# Patient Record
Sex: FEMALE | Race: WHITE | Employment: OTHER | URBAN - METROPOLITAN AREA
[De-identification: names, ages, dates, MRNs, and addresses within clinical notes are randomized per-mention and may not be internally consistent; named-entity substitution may affect disease eponyms.]

---

## 2022-03-10 ENCOUNTER — OFFICE VISIT (OUTPATIENT)
Dept: CARDIOLOGY CLINIC | Age: 62
End: 2022-03-10
Payer: COMMERCIAL

## 2022-03-10 VITALS
BODY MASS INDEX: 38.25 KG/M2 | HEART RATE: 73 BPM | SYSTOLIC BLOOD PRESSURE: 120 MMHG | WEIGHT: 238 LBS | DIASTOLIC BLOOD PRESSURE: 72 MMHG | OXYGEN SATURATION: 97 % | HEIGHT: 66 IN

## 2022-03-10 DIAGNOSIS — J45.909 UNCOMPLICATED ASTHMA, UNSPECIFIED ASTHMA SEVERITY, UNSPECIFIED WHETHER PERSISTENT: ICD-10-CM

## 2022-03-10 DIAGNOSIS — G47.33 OSA (OBSTRUCTIVE SLEEP APNEA): ICD-10-CM

## 2022-03-10 DIAGNOSIS — I15.9 SECONDARY HYPERTENSION: ICD-10-CM

## 2022-03-10 DIAGNOSIS — E07.9 THYROID DISORDER: ICD-10-CM

## 2022-03-10 DIAGNOSIS — M79.7 FIBROMYALGIA: ICD-10-CM

## 2022-03-10 DIAGNOSIS — R07.9 CHEST PAIN, UNSPECIFIED TYPE: Primary | ICD-10-CM

## 2022-03-10 DIAGNOSIS — E78.5 HYPERLIPIDEMIA, UNSPECIFIED HYPERLIPIDEMIA TYPE: ICD-10-CM

## 2022-03-10 DIAGNOSIS — Z86.711 HISTORY OF PULMONARY EMBOLISM: ICD-10-CM

## 2022-03-10 DIAGNOSIS — E11.9 CONTROLLED TYPE 2 DIABETES MELLITUS WITHOUT COMPLICATION, WITH LONG-TERM CURRENT USE OF INSULIN (HCC): ICD-10-CM

## 2022-03-10 DIAGNOSIS — Z79.4 CONTROLLED TYPE 2 DIABETES MELLITUS WITHOUT COMPLICATION, WITH LONG-TERM CURRENT USE OF INSULIN (HCC): ICD-10-CM

## 2022-03-10 PROCEDURE — 99205 OFFICE O/P NEW HI 60 MIN: CPT | Performed by: INTERNAL MEDICINE

## 2022-03-10 PROCEDURE — 93000 ELECTROCARDIOGRAM COMPLETE: CPT | Performed by: INTERNAL MEDICINE

## 2022-03-10 RX ORDER — BUPROPION HYDROCHLORIDE 300 MG/1
300 TABLET ORAL DAILY
COMMUNITY
Start: 2021-10-13

## 2022-03-10 RX ORDER — ASPIRIN 325 MG
325 TABLET, DELAYED RELEASE (ENTERIC COATED) ORAL DAILY
COMMUNITY

## 2022-03-10 RX ORDER — ROPINIROLE 1 MG/1
1 TABLET, FILM COATED ORAL DAILY
COMMUNITY
Start: 2021-10-13

## 2022-03-10 RX ORDER — CETIRIZINE HYDROCHLORIDE 10 MG/1
10 CAPSULE, LIQUID FILLED ORAL DAILY
COMMUNITY
Start: 2021-10-13

## 2022-03-10 RX ORDER — INSULIN DEGLUDEC INJECTION 200 U/ML
INJECTION, SOLUTION SUBCUTANEOUS
COMMUNITY
Start: 2022-02-07 | End: 2022-03-10

## 2022-03-10 RX ORDER — POLYETHYLENE GLYCOL 3350 17 G/17G
17 POWDER, FOR SOLUTION ORAL DAILY
COMMUNITY
Start: 2021-04-05

## 2022-03-10 RX ORDER — URSODIOL 500 MG/1
500 TABLET, FILM COATED ORAL 3 TIMES DAILY
COMMUNITY
Start: 2022-02-08

## 2022-03-10 RX ORDER — LEVOTHYROXINE SODIUM 150 MCG
TABLET ORAL
COMMUNITY
Start: 2022-02-02 | End: 2022-05-19

## 2022-03-10 RX ORDER — DAPAGLIFLOZIN 10 MG/1
10 TABLET, FILM COATED ORAL DAILY
COMMUNITY
Start: 2022-02-02

## 2022-03-10 RX ORDER — INSULIN DEGLUDEC 100 U/ML
51 INJECTION, SOLUTION SUBCUTANEOUS DAILY
COMMUNITY

## 2022-03-10 RX ORDER — FLUOXETINE HYDROCHLORIDE 40 MG/1
40 CAPSULE ORAL DAILY
COMMUNITY
Start: 2022-02-02

## 2022-03-10 RX ORDER — EZETIMIBE 10 MG/1
10 TABLET ORAL DAILY
COMMUNITY
Start: 2022-02-02

## 2022-03-10 RX ORDER — MELOXICAM 15 MG/1
1 TABLET ORAL DAILY
COMMUNITY
Start: 2021-10-13

## 2022-03-10 RX ORDER — ARIPIPRAZOLE 5 MG/1
5 TABLET ORAL DAILY
COMMUNITY
Start: 2022-02-02

## 2022-03-10 RX ORDER — FLUTICASONE PROPIONATE 50 MCG
2 SPRAY, SUSPENSION (ML) NASAL DAILY
COMMUNITY
Start: 2022-02-02

## 2022-03-10 NOTE — PROGRESS NOTES
Calvinodette Ulloa presents today for   Chief Complaint   Patient presents with    New Patient     Ref by PCP for Chest Pain / SOB    Shortness of Breath     at times    Chest Pain     1 episode 6 weeks ago, sharp / pressure across chest to shoulders       Taryn Khan preferred language for health care discussion is english/other. Is someone accompanying this pt? no    Is the patient using any DME equipment during [address]? no    Depression Screening:  3 most recent PHQ Screens 3/10/2022   Little interest or pleasure in doing things Not at all   Feeling down, depressed, irritable, or hopeless Not at all   Total Score PHQ 2 0       Learning Assessment:  Learning Assessment 3/10/2022   PRIMARY LEARNER Patient   PRIMARY LANGUAGE ENGLISH   LEARNER PREFERENCE PRIMARY DEMONSTRATION   ANSWERED BY patient   RELATIONSHIP SELF       Abuse Screening:  Abuse Screening Questionnaire 3/10/2022   Do you ever feel afraid of your partner? N   Are you in a relationship with someone who physically or mentally threatens you? N   Is it safe for you to go home? Y             Pt currently taking Anticoagulant therapy? no    Pt currently taking Antiplatelet therapy ?  mg once a day      Coordination of Care:  1. Have you been to the ER, urgent care clinic since your last visit? Hospitalized since your last visit? no    2. Have you seen or consulted any other health care providers outside of the [address] [place] [address] since your last visit? Include any pap smears or colon screening.  no

## 2022-03-10 NOTE — PROGRESS NOTES
History of Present Illness:  59year old female referred for chest pain. Four to five weeks ago she had some mild dyspnea and she had some sharp pressure sensation across her chest that woke her up from sleep. It resolved and by the morning she felt well, but in general did have some fatigue for about 24 hours and finally she is near baseline. She does continue to have some exertional dyspnea. She has never had a stress test or echocardiogram that she knows of. Currently no chest pain today. No significant edema. No PND or orthopnea. She is here to discuss options. Impression:  1. Recent episode of chest pain four to five weeks ago, occurring at night, with some mild dyspnea and symptoms of fatigue for about 24 hours after. 2. Intermittent exertional dyspnea. 3. Diabetes for 15 years with last hemoglobin A1c 8-9 on insulin. 4. Hypertension. 5. Dyslipidemia, on statin. 6. History of asthma. 7. History of remote pulmonary embolism postoperatively in 2019.  8. Thyroid disorder, stable by report. 9. Sclerosing cholangitis, conservative management and mild per patient. 10. BMI of 38.  11. History of fibromyalgia, stable. Plan:  Given her chest pain and risk factors, I recommended pharmacological cardiac nuclear stress test, as well as echocardiogram. If these are even mildly abnormal, I talked about proceeding to heart catheterization and this was discussed. If her testing is normal, I will be available as needed and I would continue with aggressive risk factor modification. All questions answered.       Past Medical History:   Diagnosis Date    Asthma     COPD (chronic obstructive pulmonary disease) (HCC)     Diabetes (HCC)     GERD (gastroesophageal reflux disease)     Heart murmur     Hypercholesterolemia     Rectal pain     discomfort.  small amount of blood on tissue off and on    RLS (restless legs syndrome)     Sleep apnea     Thyroid disease        Current Outpatient Medications Medication Sig Dispense Refill    aspirin delayed-release 325 mg tablet Take 325 mg by mouth daily.  Farxiga 10 mg tab tablet Take 10 mg by mouth daily.  buPROPion XL (WELLBUTRIN XL) 300 mg XL tablet Take 300 mg by mouth daily.  ezetimibe (ZETIA) 10 mg tablet Take 10 mg by mouth daily.  Synthroid 150 mcg tablet       ARIPiprazole (ABILIFY) 5 mg tablet Take 5 mg by mouth daily.  FLUoxetine (PROzac) 40 mg capsule Take 40 mg by mouth daily.  meloxicam (MOBIC) 15 mg tablet Take 1 Tablet by mouth daily.  fluticasone propionate (FLONASE) 50 mcg/actuation nasal spray 2 Sprays by Both Nostrils route daily.  vitamin E acetate (VITAMIN E PO) Take 5,000 mg by mouth daily.  polyethylene glycol (MIRALAX) 17 gram/dose powder Take 17 g by mouth daily.  ursodioL (ACTIGALL) 500 mg tablet Take 500 mg by mouth three (3) times daily.  rOPINIRole (REQUIP) 1 mg tablet Take 1 Tablet by mouth daily.  Cetirizine (ZyrTEC) 10 mg cap Take 10 mg by mouth daily.  insulin degludec 100 unit/mL (3 mL) inpn 51 Units by SubCUTAneous route daily.  LANSOPRAZOLE (PREVACID PO) Take 30 mg by mouth daily.  trazodone (DESYREL) 100 mg tablet Take 100 mg by mouth nightly. Social History   reports that she has never smoked. She has never used smokeless tobacco.   reports current alcohol use. Family History  family history includes Colon Polyps in her mother. Review of Systems  Except as stated above include:  Constitutional: Negative for fever, chills and malaise/fatigue. HEENT: No congestion or recent URI. Gastrointestinal: No nausea, vomiting, abdominal pain, bloody stools. Pulmonary:  Negative except as stated above. Cardiac:  Negative except as stated above. Musculoskeletal: Negative except as stated above. Neurological:  No localized symptoms. Skin:  Negative except as stated above. Psych:  Negative except as stated above.   Endocrine:  Negative except as stated above. PHYSICAL EXAM  BP Readings from Last 3 Encounters:   03/10/22 120/72   06/24/09 130/80     Pulse Readings from Last 3 Encounters:   03/10/22 73   06/24/09 74     Wt Readings from Last 3 Encounters:   03/10/22 108 kg (238 lb)   06/24/09 113.4 kg (250 lb)     General:   Well developed, well groomed. Head/Neck:   No obvious jugular venous distention     No obvious carotid pulsations. No evidence of xanthelasma. Lungs:   No respiratory distress. Clear bilaterally. Heart:  Regular rate and rhythm. Normal S1/S2. Palpation grossly normal.    No significant murmurs, rubs or gallops. Abdomen:   Non-acute abdomen. No obvious pulsations. Extremities:   Intact peripheral pulses. No significant edema. Neurological:   Alert and oriented to person, place, time. No focal neurological deficit visually. Skin:   No obvious rash    Blood Pressure Metric:  Monitor recommended and adjustments stated if needed.

## 2022-04-15 ENCOUNTER — TELEPHONE (OUTPATIENT)
Dept: CARDIOLOGY CLINIC | Age: 62
End: 2022-04-15

## 2022-04-19 ENCOUNTER — TELEPHONE (OUTPATIENT)
Dept: CARDIOLOGY CLINIC | Age: 62
End: 2022-04-19

## 2022-04-19 NOTE — TELEPHONE ENCOUNTER
Called pt to let her know that Dr Nilton Monreal wanted her to come into the office so he could discuss her nuclear stress test results. Pt's  has an appt tomorrow with Dr Nilton Monreal at [phone], and they drive from [facility], so offered appt for same time slot, and pt was gratetful for accommodation.

## 2022-04-20 ENCOUNTER — OFFICE VISIT (OUTPATIENT)
Dept: CARDIOLOGY CLINIC | Age: 62
End: 2022-04-20
Payer: COMMERCIAL

## 2022-04-20 VITALS
DIASTOLIC BLOOD PRESSURE: 64 MMHG | WEIGHT: 236 LBS | HEART RATE: 70 BPM | HEIGHT: 66 IN | BODY MASS INDEX: 37.93 KG/M2 | OXYGEN SATURATION: 96 % | SYSTOLIC BLOOD PRESSURE: 100 MMHG

## 2022-04-20 DIAGNOSIS — E11.9 CONTROLLED TYPE 2 DIABETES MELLITUS WITHOUT COMPLICATION, WITH LONG-TERM CURRENT USE OF INSULIN (HCC): ICD-10-CM

## 2022-04-20 DIAGNOSIS — R94.39 ABNORMAL STRESS TEST: Primary | ICD-10-CM

## 2022-04-20 DIAGNOSIS — E07.9 THYROID DISORDER: ICD-10-CM

## 2022-04-20 DIAGNOSIS — R07.9 CHEST PAIN, UNSPECIFIED TYPE: ICD-10-CM

## 2022-04-20 DIAGNOSIS — E78.5 HYPERLIPIDEMIA, UNSPECIFIED HYPERLIPIDEMIA TYPE: ICD-10-CM

## 2022-04-20 DIAGNOSIS — Z79.4 CONTROLLED TYPE 2 DIABETES MELLITUS WITHOUT COMPLICATION, WITH LONG-TERM CURRENT USE OF INSULIN (HCC): ICD-10-CM

## 2022-04-20 DIAGNOSIS — G47.33 OSA (OBSTRUCTIVE SLEEP APNEA): ICD-10-CM

## 2022-04-20 DIAGNOSIS — M79.7 FIBROMYALGIA: ICD-10-CM

## 2022-04-20 PROCEDURE — 99215 OFFICE O/P EST HI 40 MIN: CPT | Performed by: INTERNAL MEDICINE

## 2022-04-20 RX ORDER — INSULIN GLARGINE 300 U/ML
150 INJECTION, SOLUTION SUBCUTANEOUS
COMMUNITY
Start: 2021-11-30 | End: 2022-05-19

## 2022-04-20 RX ORDER — ALBUTEROL SULFATE 90 UG/1
AEROSOL, METERED RESPIRATORY (INHALATION)
COMMUNITY
Start: 2022-02-02 | End: 2022-05-19

## 2022-04-20 RX ORDER — SODIUM CHLORIDE 0.9 % (FLUSH) 0.9 %
5-40 SYRINGE (ML) INJECTION EVERY 8 HOURS
Status: CANCELLED | OUTPATIENT
Start: 2022-04-20

## 2022-04-20 RX ORDER — SODIUM CHLORIDE 9 MG/ML
1000 INJECTION, SOLUTION INTRAVENOUS CONTINUOUS
Status: CANCELLED | OUTPATIENT
Start: 2022-04-20

## 2022-04-20 RX ORDER — SODIUM CHLORIDE 0.9 % (FLUSH) 0.9 %
5-40 SYRINGE (ML) INJECTION AS NEEDED
Status: CANCELLED | OUTPATIENT
Start: 2022-04-20

## 2022-04-20 NOTE — PATIENT INSTRUCTIONS
Instructions    Please have Lab work and Chest x-ray completed soon. Patients Name:  Vicenta Fox    1. You are scheduled to have a Cardiac Catheterization on April 27, 2022  at 1:00 p.m. Please check in at 11:30 a.m  .     2. Please go to [facility] and park in the outpatient parking lot that is located around to the back of the hospital and enter through the [facility] building. Once you enter through the [facility], check in with the  there. The  will either give you directions or assist you in getting to the cath holding area. 3. You are not to eat or drink anything after midnight the night before your procedure. Small sips of water to take your medications is ok. 4. If you are diabetic, do not take your insulin/sugar pill the morning of the procedure. (FARXIGA, Insulin    5. MEDICATION INSTRUCTIONS:   Please take your morning medications with the following special instructions:    [x]          Please make sure to take your Blood pressure medication : MOBIC. [x]          Take your Aspirin       6. We encourage families to wait in the waiting room on the first floor while the procedure is being done. The Doctor will come out and talk with you as soon as the procedure is over. 7. There is the possibility that you may spend the night in the hospital, depending on the results of the procedure. This will be determined after the procedure is done. If angioplasty or stent is planned, you will stay at least one day. 8. If you or your family have any questions, please call our office Monday Friday, 9:00 a. m.4:30 p.m.,  At [phone], and ask to speak to one of the nurses. You will have a follow up appointment in office 4-6 weeks after procedure- someone should call you to schedule.

## 2022-04-20 NOTE — PROGRESS NOTES
History of Present Illness:  57year old female here for follow up. I saw her in March. She had been having some occasional chest pain for four to five weeks and mild dyspnea. She still is having some pain, usually at night, and she has some exertional dyspnea. No syncope, PND, orthopnea or edema. She is accompanied with her  today, who I am also seeing. She has never had a heart catheterization. She had a recent stress test that was abnormal and is here to discuss results. Impression:  1. Abnormal intermediate risk stress test earlier this week with possible inferior ischemia. Intermittent chest pain and dyspnea in the setting of diabetes as an anginal equivalent. 2. Diabetes for 15 years with hemoglobin A1c running 8-9 on insulin. 3. Hypertension. 4. Dyslipidemia, on statin. 5. History of asthma. 6. Remote PE postoperatively in 2019, on oral anticoagulation transiently, not currently. She is just on aspirin. 7. Thyroid disorder. 8. History of sclerosing cholangitis with conservative management in the past.  9. History of fibromyalgia. Plan:  Although she has a history of fibromyalgia and sclerosing cholangitis, asthma, and other potential etiologies for chest pain, given her risk factors and abnormal stress test, I recommended heart catheterization. Risks, benefits and alternatives discussed, all questions answered and I will make arrangements.       Past Medical History:   Diagnosis Date    Asthma     COPD (chronic obstructive pulmonary disease) (HCC)     Diabetes (HCC)     GERD (gastroesophageal reflux disease)     Heart murmur     Hypercholesterolemia     Rectal pain     discomfort.  small amount of blood on tissue off and on    RLS (restless legs syndrome)     Sleep apnea     Thyroid disease        Current Outpatient Medications   Medication Sig Dispense Refill    albuterol (PROVENTIL HFA, VENTOLIN HFA, PROAIR HFA) 90 mcg/actuation inhaler       insulin glargine U-300 conc (Toujeo Max U-300 SoloStar) 300 unit/mL (3 mL) inpn 150 Units.  aspirin delayed-release 325 mg tablet Take 325 mg by mouth daily.  Farxiga 10 mg tab tablet Take 10 mg by mouth daily.  buPROPion XL (WELLBUTRIN XL) 300 mg XL tablet Take 300 mg by mouth daily.  ezetimibe (ZETIA) 10 mg tablet Take 10 mg by mouth daily.  Synthroid 150 mcg tablet       ARIPiprazole (ABILIFY) 5 mg tablet Take 5 mg by mouth daily.  FLUoxetine (PROzac) 40 mg capsule Take 40 mg by mouth daily.  meloxicam (MOBIC) 15 mg tablet Take 1 Tablet by mouth daily.  fluticasone propionate (FLONASE) 50 mcg/actuation nasal spray 2 Sprays by Both Nostrils route daily.  vitamin E acetate (VITAMIN E PO) Take 5,000 mg by mouth daily.  polyethylene glycol (MIRALAX) 17 gram/dose powder Take 17 g by mouth daily.  ursodioL (ACTIGALL) 500 mg tablet Take 500 mg by mouth three (3) times daily.  rOPINIRole (REQUIP) 1 mg tablet Take 1 Tablet by mouth daily.  Cetirizine (ZyrTEC) 10 mg cap Take 10 mg by mouth daily.  insulin degludec 100 unit/mL (3 mL) inpn 51 Units by SubCUTAneous route daily.  LANSOPRAZOLE (PREVACID PO) Take 30 mg by mouth daily.  trazodone (DESYREL) 100 mg tablet Take 100 mg by mouth nightly. Social History   reports that she has never smoked. She has never used smokeless tobacco.   reports current alcohol use. Family History  family history includes Colon Polyps in her mother. Review of Systems  Except as stated above include:  Constitutional: Negative for fever, chills and malaise/fatigue. HEENT: No congestion or recent URI. Gastrointestinal: No nausea, vomiting, abdominal pain, bloody stools. Pulmonary:  Negative except as stated above. Cardiac:  Negative except as stated above. Musculoskeletal: Negative except as stated above. Neurological:  No localized symptoms. Skin:  Negative except as stated above.   Psych:  Negative except as stated above.  Endocrine:  Negative except as stated above. PHYSICAL EXAM  BP Readings from Last 3 Encounters:   04/20/22 100/64   04/18/22 120/72   04/18/22 120/80     Pulse Readings from Last 3 Encounters:   04/20/22 70   03/10/22 73   06/24/09 74     Wt Readings from Last 3 Encounters:   04/20/22 107 kg (236 lb)   04/18/22 108 kg (238 lb)   04/18/22 108 kg (238 lb)     General:   Well developed, well groomed. Head/Neck:   No obvious jugular venous distention     No obvious carotid pulsations. No evidence of xanthelasma. Lungs:   No respiratory distress. Clear bilaterally. Heart:  Regular rate and rhythm. Normal S1/S2. Palpation grossly normal.    No significant murmurs, rubs or gallops. Abdomen:   Non-acute abdomen. No obvious pulsations. Extremities:   Intact peripheral pulses. No significant edema. Neurological:   Alert and oriented to person, place, time. No focal neurological deficit visually. Skin:   No obvious rash    Blood Pressure Metric:  Monitor recommended and adjustments stated if needed.

## 2022-04-20 NOTE — PROGRESS NOTES
Aissatou Ponce presents today for   Chief Complaint   Patient presents with    Follow-up     abn nuc        Sharlett Carrier Gladyso preferred language for health care discussion is english/other. Is someone accompanying this pt? no    Is the patient using any DME equipment during [address]? no    Depression Screening:  3 most recent PHQ Screens 3/10/2022   Little interest or pleasure in doing things Not at all   Feeling down, depressed, irritable, or hopeless Not at all   Total Score PHQ 2 0       Learning Assessment:  Learning Assessment 3/10/2022   PRIMARY LEARNER Patient   PRIMARY LANGUAGE ENGLISH   LEARNER PREFERENCE PRIMARY DEMONSTRATION   ANSWERED BY patient   RELATIONSHIP SELF       Abuse Screening:  Abuse Screening Questionnaire 3/10/2022   Do you ever feel afraid of your partner? N   Are you in a relationship with someone who physically or mentally threatens you? N   Is it safe for you to go home? Y     Pt currently taking Anticoagulant therapy? ASA 81mg every day     Coordination of Care:  1. Have you been to the ER, urgent care clinic since your last visit? Hospitalized since your last visit? no    2. Have you seen or consulted any other health care providers outside of the [address] [place] [address] since your last visit? Include any pap smears or colon screening.  no

## 2022-04-21 ENCOUNTER — HOSPITAL ENCOUNTER (OUTPATIENT)
Dept: LAB | Age: 62
Discharge: HOME | End: 2022-04-21
Payer: COMMERCIAL

## 2022-04-21 ENCOUNTER — HOSPITAL ENCOUNTER (OUTPATIENT)
Dept: GENERAL RADIOLOGY | Age: 62
Discharge: HOME | End: 2022-04-21
Payer: COMMERCIAL

## 2022-04-21 DIAGNOSIS — R94.39 ABNORMAL STRESS TEST: ICD-10-CM

## 2022-04-21 LAB
ALBUMIN SERPL-MCNC: 3.7 G/DL (ref 3.4–5)
ALBUMIN SERPL-MCNC: 3.7 G/DL (ref 3.4–5)
ALBUMIN/GLOB SERPL: 1 {RATIO} (ref 0.8–1.7)
ALBUMIN/GLOB SERPL: 1.1 {RATIO} (ref 0.8–1.7)
ALP SERPL-CCNC: 184 U/L (ref 45–117)
ALP SERPL-CCNC: 185 U/L (ref 45–117)
ALT SERPL-CCNC: 35 U/L (ref 13–56)
ALT SERPL-CCNC: 36 U/L (ref 13–56)
ANION GAP SERPL CALC-SCNC: 5 MMOL/L (ref 3–18)
ANION GAP SERPL CALC-SCNC: 7 MMOL/L (ref 3–18)
AST SERPL-CCNC: 15 U/L (ref 10–38)
AST SERPL-CCNC: 17 U/L (ref 10–38)
BASOPHILS # BLD: 0.1 K/UL (ref 0–0.1)
BASOPHILS # BLD: 0.1 K/UL (ref 0–0.1)
BASOPHILS NFR BLD: 0 % (ref 0–2)
BASOPHILS NFR BLD: 1 % (ref 0–2)
BILIRUB SERPL-MCNC: 0.3 MG/DL (ref 0.2–1)
BILIRUB SERPL-MCNC: 0.3 MG/DL (ref 0.2–1)
BUN SERPL-MCNC: 18 MG/DL (ref 7–18)
BUN SERPL-MCNC: 18 MG/DL (ref 7–18)
BUN/CREAT SERPL: 21 (ref 12–20)
BUN/CREAT SERPL: 22 (ref 12–20)
CALCIUM SERPL-MCNC: 9.5 MG/DL (ref 8.5–10.1)
CALCIUM SERPL-MCNC: 9.6 MG/DL (ref 8.5–10.1)
CHLORIDE SERPL-SCNC: 111 MMOL/L (ref 100–111)
CHLORIDE SERPL-SCNC: 111 MMOL/L (ref 100–111)
CHOLEST SERPL-MCNC: 187 MG/DL
CO2 SERPL-SCNC: 25 MMOL/L (ref 21–32)
CO2 SERPL-SCNC: 26 MMOL/L (ref 21–32)
CREAT SERPL-MCNC: 0.82 MG/DL (ref 0.6–1.3)
CREAT SERPL-MCNC: 0.87 MG/DL (ref 0.6–1.3)
DIFFERENTIAL METHOD BLD: ABNORMAL
DIFFERENTIAL METHOD BLD: ABNORMAL
EOSINOPHIL # BLD: 0.3 K/UL (ref 0–0.4)
EOSINOPHIL # BLD: 0.3 K/UL (ref 0–0.4)
EOSINOPHIL NFR BLD: 3 % (ref 0–5)
EOSINOPHIL NFR BLD: 3 % (ref 0–5)
ERYTHROCYTE [DISTWIDTH] IN BLOOD BY AUTOMATED COUNT: 12.7 % (ref 11.6–14.5)
ERYTHROCYTE [DISTWIDTH] IN BLOOD BY AUTOMATED COUNT: 12.8 % (ref 11.6–14.5)
GLOBULIN SER CALC-MCNC: 3.5 G/DL (ref 2–4)
GLOBULIN SER CALC-MCNC: 3.6 G/DL (ref 2–4)
GLUCOSE SERPL-MCNC: 55 MG/DL (ref 74–99)
GLUCOSE SERPL-MCNC: 57 MG/DL (ref 74–99)
HBA1C MFR BLD: 8.7 % (ref 4.2–5.6)
HCT VFR BLD AUTO: 45.5 % (ref 35–45)
HCT VFR BLD AUTO: 47.2 % (ref 35–45)
HDLC SERPL-MCNC: 51 MG/DL (ref 40–60)
HDLC SERPL: 3.7 {RATIO} (ref 0–5)
HGB BLD-MCNC: 15 G/DL (ref 12–16)
HGB BLD-MCNC: 15.3 G/DL (ref 12–16)
IMM GRANULOCYTES # BLD AUTO: 0 K/UL (ref 0–0.04)
IMM GRANULOCYTES # BLD AUTO: 0 K/UL (ref 0–0.04)
IMM GRANULOCYTES NFR BLD AUTO: 0 % (ref 0–0.5)
IMM GRANULOCYTES NFR BLD AUTO: 0 % (ref 0–0.5)
INR PPP: 1 (ref 0.8–1.2)
LDLC SERPL CALC-MCNC: 105 MG/DL (ref 0–100)
LIPID PROFILE,FLP: ABNORMAL
LYMPHOCYTES # BLD: 2.6 K/UL (ref 0.9–3.6)
LYMPHOCYTES # BLD: 2.7 K/UL (ref 0.9–3.6)
LYMPHOCYTES NFR BLD: 24 % (ref 21–52)
LYMPHOCYTES NFR BLD: 24 % (ref 21–52)
MCH RBC QN AUTO: 29.7 PG (ref 24–34)
MCH RBC QN AUTO: 30.1 PG (ref 24–34)
MCHC RBC AUTO-ENTMCNC: 32.4 G/DL (ref 31–37)
MCHC RBC AUTO-ENTMCNC: 33 G/DL (ref 31–37)
MCV RBC AUTO: 91.2 FL (ref 78–100)
MCV RBC AUTO: 91.5 FL (ref 78–100)
MONOCYTES # BLD: 0.6 K/UL (ref 0.05–1.2)
MONOCYTES # BLD: 0.6 K/UL (ref 0.05–1.2)
MONOCYTES NFR BLD: 5 % (ref 3–10)
MONOCYTES NFR BLD: 5 % (ref 3–10)
NEUTS SEG # BLD: 7.4 K/UL (ref 1.8–8)
NEUTS SEG # BLD: 7.6 K/UL (ref 1.8–8)
NEUTS SEG NFR BLD: 68 % (ref 40–73)
NEUTS SEG NFR BLD: 68 % (ref 40–73)
NRBC # BLD: 0 K/UL (ref 0–0.01)
NRBC # BLD: 0 K/UL (ref 0–0.01)
NRBC BLD-RTO: 0 PER 100 WBC
NRBC BLD-RTO: 0 PER 100 WBC
PLATELET # BLD AUTO: 353 K/UL (ref 135–420)
PLATELET # BLD AUTO: 358 K/UL (ref 135–420)
PMV BLD AUTO: 9.6 FL (ref 9.2–11.8)
PMV BLD AUTO: 9.9 FL (ref 9.2–11.8)
POTASSIUM SERPL-SCNC: 3.8 MMOL/L (ref 3.5–5.5)
POTASSIUM SERPL-SCNC: 3.9 MMOL/L (ref 3.5–5.5)
PROT SERPL-MCNC: 7.2 G/DL (ref 6.4–8.2)
PROT SERPL-MCNC: 7.3 G/DL (ref 6.4–8.2)
PROTHROMBIN TIME: 12.8 SEC (ref 11.5–15.2)
RBC # BLD AUTO: 4.99 M/UL (ref 4.2–5.3)
RBC # BLD AUTO: 5.16 M/UL (ref 4.2–5.3)
SODIUM SERPL-SCNC: 142 MMOL/L (ref 136–145)
SODIUM SERPL-SCNC: 143 MMOL/L (ref 136–145)
T4 FREE SERPL-MCNC: 1.1 NG/DL (ref 0.7–1.5)
TRIGL SERPL-MCNC: 155 MG/DL (ref ?–150)
TSH SERPL DL<=0.05 MIU/L-ACNC: 2.37 UIU/ML (ref 0.36–3.74)
VLDLC SERPL CALC-MCNC: 31 MG/DL
WBC # BLD AUTO: 10.9 K/UL (ref 4.6–13.2)
WBC # BLD AUTO: 11.2 K/UL (ref 4.6–13.2)

## 2022-04-21 PROCEDURE — 85025 COMPLETE CBC W/AUTO DIFF WBC: CPT

## 2022-04-21 PROCEDURE — 80053 COMPREHEN METABOLIC PANEL: CPT

## 2022-04-21 PROCEDURE — 85610 PROTHROMBIN TIME: CPT

## 2022-04-21 PROCEDURE — 80061 LIPID PANEL: CPT

## 2022-04-21 PROCEDURE — 84439 ASSAY OF FREE THYROXINE: CPT

## 2022-04-21 PROCEDURE — 83036 HEMOGLOBIN GLYCOSYLATED A1C: CPT

## 2022-04-21 PROCEDURE — 71046 X-RAY EXAM CHEST 2 VIEWS: CPT

## 2022-04-21 PROCEDURE — 36415 COLL VENOUS BLD VENIPUNCTURE: CPT

## 2022-04-27 ENCOUNTER — HOSPITAL ENCOUNTER (OUTPATIENT)
Age: 62
Setting detail: OUTPATIENT SURGERY
Discharge: HOME | End: 2022-04-27
Attending: INTERNAL MEDICINE | Admitting: INTERNAL MEDICINE
Payer: COMMERCIAL

## 2022-04-27 VITALS
TEMPERATURE: 98.2 F | RESPIRATION RATE: 18 BRPM | SYSTOLIC BLOOD PRESSURE: 106 MMHG | BODY MASS INDEX: 37.93 KG/M2 | WEIGHT: 236 LBS | HEART RATE: 77 BPM | OXYGEN SATURATION: 96 % | HEIGHT: 66 IN | DIASTOLIC BLOOD PRESSURE: 69 MMHG

## 2022-04-27 DIAGNOSIS — R93.1 ABNORMAL NUCLEAR CARDIAC IMAGING TEST: ICD-10-CM

## 2022-04-27 LAB — GLUCOSE BLD STRIP.AUTO-MCNC: 74 MG/DL (ref 70–110)

## 2022-04-27 PROCEDURE — 93458 L HRT ARTERY/VENTRICLE ANGIO: CPT | Performed by: INTERNAL MEDICINE

## 2022-04-27 PROCEDURE — 74011250636 HC RX REV CODE- 250/636: Performed by: INTERNAL MEDICINE

## 2022-04-27 PROCEDURE — 74011250637 HC RX REV CODE- 250/637: Performed by: INTERNAL MEDICINE

## 2022-04-27 PROCEDURE — 99152 MOD SED SAME PHYS/QHP 5/>YRS: CPT | Performed by: INTERNAL MEDICINE

## 2022-04-27 PROCEDURE — 74011000636 HC RX REV CODE- 636: Performed by: INTERNAL MEDICINE

## 2022-04-27 PROCEDURE — 74011000250 HC RX REV CODE- 250: Performed by: INTERNAL MEDICINE

## 2022-04-27 PROCEDURE — 77030015766: Performed by: INTERNAL MEDICINE

## 2022-04-27 PROCEDURE — 77030012597: Performed by: INTERNAL MEDICINE

## 2022-04-27 PROCEDURE — 77030013761 HC KT HRT LFT ANGI -B: Performed by: INTERNAL MEDICINE

## 2022-04-27 PROCEDURE — C1894 INTRO/SHEATH, NON-LASER: HCPCS | Performed by: INTERNAL MEDICINE

## 2022-04-27 PROCEDURE — 51700 IRRIGATION OF BLADDER: CPT

## 2022-04-27 PROCEDURE — 77030013797 HC KT TRNSDUC PRSSR EDWD -A: Performed by: INTERNAL MEDICINE

## 2022-04-27 PROCEDURE — 82962 GLUCOSE BLOOD TEST: CPT

## 2022-04-27 PROCEDURE — 77030029997 HC DEV COM RDL R BND TELE -B: Performed by: INTERNAL MEDICINE

## 2022-04-27 RX ORDER — HEPARIN SODIUM 1000 [USP'U]/ML
INJECTION, SOLUTION INTRAVENOUS; SUBCUTANEOUS AS NEEDED
Status: DISCONTINUED | OUTPATIENT
Start: 2022-04-27 | End: 2022-04-27 | Stop reason: HOSPADM

## 2022-04-27 RX ORDER — HEPARIN SODIUM 200 [USP'U]/100ML
INJECTION, SOLUTION INTRAVENOUS AS NEEDED
Status: DISCONTINUED | OUTPATIENT
Start: 2022-04-27 | End: 2022-04-27 | Stop reason: HOSPADM

## 2022-04-27 RX ORDER — GUAIFENESIN 100 MG/5ML
324 LIQUID (ML) ORAL
Status: COMPLETED | OUTPATIENT
Start: 2022-04-27 | End: 2022-04-27

## 2022-04-27 RX ORDER — VERAPAMIL HYDROCHLORIDE 2.5 MG/ML
INJECTION, SOLUTION INTRAVENOUS AS NEEDED
Status: DISCONTINUED | OUTPATIENT
Start: 2022-04-27 | End: 2022-04-27 | Stop reason: HOSPADM

## 2022-04-27 RX ORDER — LIDOCAINE HYDROCHLORIDE 10 MG/ML
INJECTION, SOLUTION EPIDURAL; INFILTRATION; INTRACAUDAL; PERINEURAL AS NEEDED
Status: DISCONTINUED | OUTPATIENT
Start: 2022-04-27 | End: 2022-04-27 | Stop reason: HOSPADM

## 2022-04-27 RX ORDER — SODIUM CHLORIDE 9 MG/ML
1000 INJECTION, SOLUTION INTRAVENOUS CONTINUOUS
Status: DISCONTINUED | OUTPATIENT
Start: 2022-04-27 | End: 2022-04-27 | Stop reason: HOSPADM

## 2022-04-27 RX ORDER — SODIUM CHLORIDE 0.9 % (FLUSH) 0.9 %
5-40 SYRINGE (ML) INJECTION EVERY 8 HOURS
Status: DISCONTINUED | OUTPATIENT
Start: 2022-04-27 | End: 2022-04-27 | Stop reason: HOSPADM

## 2022-04-27 RX ORDER — SODIUM CHLORIDE 0.9 % (FLUSH) 0.9 %
5-40 SYRINGE (ML) INJECTION AS NEEDED
Status: DISCONTINUED | OUTPATIENT
Start: 2022-04-27 | End: 2022-04-27 | Stop reason: HOSPADM

## 2022-04-27 RX ORDER — MIDAZOLAM HYDROCHLORIDE 1 MG/ML
INJECTION, SOLUTION INTRAMUSCULAR; INTRAVENOUS AS NEEDED
Status: DISCONTINUED | OUTPATIENT
Start: 2022-04-27 | End: 2022-04-27 | Stop reason: HOSPADM

## 2022-04-27 RX ORDER — FENTANYL CITRATE 50 UG/ML
INJECTION, SOLUTION INTRAMUSCULAR; INTRAVENOUS AS NEEDED
Status: DISCONTINUED | OUTPATIENT
Start: 2022-04-27 | End: 2022-04-27 | Stop reason: HOSPADM

## 2022-04-27 RX ADMIN — ASPIRIN 81 MG 324 MG: 81 TABLET ORAL at 12:06

## 2022-04-27 NOTE — Clinical Note
Contrast Dose Calculator:   Patient's age: 57.   Patient's sex: Female. Patient weight (kg) = 107. Creatinine level (mg/dL) = 0.82. Creatinine clearance (mL/min): 117. Contrast concentration (mg/mL) = 300. MACD = 300 mL. Max Contrast dose per Creatinine Cl calculator = 263.25 mL.

## 2022-04-27 NOTE — Clinical Note
TRANSFER - OUT REPORT:     Verbal report given to: Bailee Ruiz. Report consisted of patient's Situation, Background, Assessment and   Recommendations(SBAR). Opportunity for questions and clarification was provided. Patient transported with a Cardiac Cath Tech / Patient Care Tech. Patient transported to: holding area.

## 2022-04-27 NOTE — H&P
Please see clinic note for detail. Patient with CP and abnormal stress test  I saw and examined patient and confirmed findings  No interval change. Labs reviewed. Procedure explained to patient and all risk and benefit discussed with patient. Risk, benefit, complication of LHC and possible PCI ( including but not limited to bleeding, infection, heart failure, stroke, MI, emergent bypass surgery, kidney failure, dialysis and death ) were discussed with patient and willing to proceed with procedure. Proceed as planned. DW daughter as well at bedside    History and physical has been reviewed.  There have been no significant clinical changes since the completion of the originally dated History and Physical.  Will be using moderate sedation.    ------------------------------------------------------------------------------------------------------------------

## 2022-04-27 NOTE — Clinical Note
TRANSFER - IN REPORT:     Verbal report received from: Jose Gibbons RN. Report consisted of patient's Situation, Background, Assessment and   Recommendations(SBAR). Opportunity for questions and clarification was provided. Assessment completed upon patient's arrival to unit and care assumed. Patient transported with a Cardiac Cath Tech / Patient Care Tech.

## 2022-04-27 NOTE — DISCHARGE INSTRUCTIONS
HEART CATHETERIZATION/ANGIOGRAPHY DISCHARGE INSTRUCTIONS    1. Check puncture site frequently for swelling or bleeding. If there is any bleeding, lie down and apply pressure over the area with a clean towel or washcloth. Notify your doctor for any redness, swelling, drainage, or oozing from the puncture site. Notify your doctor for any fever or chills. 2. If the extremity becomes cold, numb, or painful call Dr. Nilton Monreal. 3. Activity should be limited for the next 48 hours. Climb stairs as little as possible and avoid any stooping, bending, or strenuous activity for 48 hours. No heavy lifting (anything over 10 pounds) for 3 days. 4. You may resume your usual diet. Drink more fluids than usual.  5. Have a responsible person drive you home and stay with you for at least 24 hours after your heart catheterization/angiography. You may remove bandage from your Right and Arm  DISCHARGE SUMMARY from Nurse    PATIENT INSTRUCTIONS:    After general anesthesia or intravenous sedation, for 24 hours or while taking prescription Narcotics:  · Limit your activities  · Do not drive and operate hazardous machinery  · Do not make important personal or business decisions  · Do  not drink alcoholic beverages  · If you have not urinated within 8 hours after discharge, please contact your surgeon on call.     Report the following to your surgeon:  · Excessive pain, swelling, redness or odor of or around the surgical area  · Temperature over 100.5  · Nausea and vomiting lasting longer than 4 hours or if unable to take medications  · Any signs of decreased circulation or nerve impairment to extremity: change in color, persistent  numbness, tingling, coldness or increase pain  · Any questions    What to do at Home:  Recommended activity: Activity as tolerated and no driving for today,     These are general instructions for a healthy lifestyle:    No smoking/ No tobacco products/ Avoid exposure to second hand smoke  Surgeon Monisha Méndez Warning:  Quitting smoking now greatly reduces serious risk to your health. Obesity, smoking, and sedentary lifestyle greatly increases your risk for illness    A healthy diet, regular physical exercise & weight monitoring are important for maintaining a healthy lifestyle    You may be retaining fluid if you have a history of heart failure or if you experience any of the following symptoms:  Weight gain of 3 pounds or more overnight or 5 pounds in a week, increased swelling in our hands or feet or shortness of breath while lying flat in bed. Please call your doctor as soon as you notice any of these symptoms; do not wait until your next office visit. The discharge information has been reviewed with the patient. The patient verbalized understanding. Discharge medications reviewed with the patient and appropriate educational materials and side effects teaching were provided. 6. ___________________________________________________________________________________________________________________________________ in 24 hours. You may shower in 24 hours. No tub baths, hot tubs, or swimming for 1 week. Do not place any lotions, creams, powders, or ointments over puncture site for 1 week. You may place a clean band-aid over the puncture site each day for 5 days. Change daily. I have read the above instructions and have had the opportunity to ask questions.       Patient: ________________________   Date: 4/27/2022    Witness: _______________________   Date: 4/27/2022

## 2022-05-13 NOTE — PROGRESS NOTES
Lisy Mason presents today for a post-cardiac catheterization follow-up. She had a nuclear stress test on 4/18/22 that was abnormal and showed abnormal LV perfusion and evidence of inducible ischemia. Cardiac catheterization was recommended when she saw Dr. Gay Lux on 4/20/22. She underwent cardiac catheterization on 4/27/22 and it demonstrated:  No evidence of obstructive coronary artery disease. Minimal proximal and mid LAD 20-30% luminal irregularities  Large dominant RCA without any obstructive disease  Small caliber LCx and OM without any obstructive disease  Normal LVEF and LVEDP  Continue aggressive medical management    She states that since the cardiac catheterization, her PCP started her on low dose rosuvastatin. She reports having problems with taking statins in the past.  So far, she has been doing well on the rosuvastatin. She states that the shortness of breath she was experiencing prior to the stress test and cath, resolved after the catheterization. She is a 57year old female with history of hypertension, dyslipidemia, diabetes (on insulin), asthma, remote PE (for which she took anticoagulation for a period of time), thyroid disorder, fibromyalgia, and sclerosing cholangitis. She had an echo done on 4/18/22 and it showed an EF of 55-60%, normal wall motion, and moderately dilated aortic root at 4.5 cm. Denies chest pain, tightness, heaviness, and palpitations. Denies shortness of breath at rest, dyspnea on exertion, orthopnea and PND. Denies abdominal bloating. Denies lightheadedness, dizziness, and syncope. Denies lower extremity edema and claudication. Denies nausea, vomiting, diarrhea, melena, hematochezia. Denies hematuria, urgency, frequency. Denies fever, chills.         PMH:  Past Medical History:   Diagnosis Date    Asthma     COPD (chronic obstructive pulmonary disease) (HCC)     Diabetes (HCC)     GERD (gastroesophageal reflux disease)     Heart murmur     Hypercholesterolemia     Rectal pain     discomfort.  small amount of blood on tissue off and on    RLS (restless legs syndrome)     Sleep apnea     Thyroid disease        PSH:  Past Surgical History:   Procedure Laterality Date    HX DILATION AND CURETTAGE      HX HYSTERECTOMY      HX OTHER SURGICAL      jaw surgery    HX OTHER SURGICAL      shoulder resection    HX TONSILLECTOMY         MEDS:  Current Outpatient Medications   Medication Sig    rosuvastatin (CRESTOR) 5 mg tablet Three (3) times a week.  Ozempic 1 mg/dose (4 mg/3 mL) pnij 0.5 mL by Injection route every seven (7) days. Or as directed    albuterol (PROVENTIL HFA, VENTOLIN HFA, PROAIR HFA) 90 mcg/actuation inhaler Take 2 Puffs by inhalation every six (6) hours as needed for Wheezing.  Synthroid 150 mcg tablet Take 1 Tablet by mouth Daily (before breakfast).  aspirin delayed-release 325 mg tablet Take 325 mg by mouth daily.  Farxiga 10 mg tab tablet Take 10 mg by mouth daily.  buPROPion XL (WELLBUTRIN XL) 300 mg XL tablet Take 300 mg by mouth daily.  ezetimibe (ZETIA) 10 mg tablet Take 10 mg by mouth daily.  ARIPiprazole (ABILIFY) 5 mg tablet Take 5 mg by mouth daily.  FLUoxetine (PROzac) 40 mg capsule Take 40 mg by mouth daily.  meloxicam (MOBIC) 15 mg tablet Take 1 Tablet by mouth daily.  fluticasone propionate (FLONASE) 50 mcg/actuation nasal spray 2 Sprays by Both Nostrils route daily.  vitamin E acetate (VITAMIN E PO) Take 5,000 mg by mouth daily.  polyethylene glycol (MIRALAX) 17 gram/dose powder Take 17 g by mouth daily.  ursodioL (ACTIGALL) 500 mg tablet Take 500 mg by mouth three (3) times daily.  rOPINIRole (REQUIP) 1 mg tablet Take 1 Tablet by mouth daily.  Cetirizine (ZyrTEC) 10 mg cap Take 10 mg by mouth daily.  insulin degludec 100 unit/mL (3 mL) inpn 51 Units by SubCUTAneous route daily.  LANSOPRAZOLE (PREVACID PO) Take 30 mg by mouth daily.     trazodone (DESYREL) 100 mg tablet Take 100 mg by mouth nightly. No current facility-administered medications for this visit. Allergies and Sensitivities:  Allergies   Allergen Reactions    Sulfa (Sulfonamide Antibiotics) Unknown (comments)       Family History:  Family History   Problem Relation Age of Onset    Colon Polyps Mother        Social History:  She  reports that she has never smoked. She has never used smokeless tobacco.  She  reports current alcohol use. Physical:  Visit Vitals  /72 (BP 1 Location: Left upper arm, BP Patient Position: Sitting, BP Cuff Size: Adult long)   Pulse 72   Ht 5' 6\" (1.676 m)   Wt 107.5 kg (237 lb)   SpO2 97%   BMI 38.25 kg/m²         Exam:  Neck:  Supple, no JVD, no carotid bruits  CV:  Normal S1 and  S2, no murmurs, rubs, or gallops noted  Lungs:  Clear to ausculation throughout, no wheezes or rales  Abd:  Soft, non-tender, non-distended with good bowel sounds. No hepatosplenomegaly  Extremities:  No edema      Data:  EKG:      LABS:  Lab Results   Component Value Date/Time    Sodium 143 04/21/2022 02:50 PM    Potassium 3.9 04/21/2022 02:50 PM    Chloride 111 04/21/2022 02:50 PM    CO2 25 04/21/2022 02:50 PM    Glucose 55 (L) 04/21/2022 02:50 PM    BUN 18 04/21/2022 02:50 PM    Creatinine 0.82 04/21/2022 02:50 PM     Lab Results   Component Value Date/Time    Cholesterol, total 187 04/21/2022 02:50 PM    HDL Cholesterol 51 04/21/2022 02:50 PM    LDL, calculated 105 (H) 04/21/2022 02:50 PM    Triglyceride 155 (H) 04/21/2022 02:50 PM    CHOL/HDL Ratio 3.7 04/21/2022 02:50 PM     Lab Results   Component Value Date/Time    ALT (SGPT) 36 04/21/2022 02:50 PM         Impression/Plan:  1. No obstructive CAD by cath  2. States that she has never been hypertensive  3. Hyperlipidemia, recently started on rosuvastatin 5mg by her PCP  4. Diabetes mellitus, recommend Hgb A1c less than 7% from cardiac standpoint  5. Asthma  6. Thyroid disorder  7. Fibromyalgia  8.   Sclerosing cholangitis Ms. Elif Wong was seen today for follow-up post-cardiac catheterization. She reports that she has been feeling much better since the cardiac catheterization and that the shortness of breath she was experiencing prior to the cath, resolved afterwards. She reports history of asthma and since her cardiac work-up has been essentially negative, she will look into further work-up of her asthma if her symptoms return (shortness of breath). I reviewed the results of the cath and echo again with her as she did not understand all of the details. Her blood pressure is well controlled, breath sounds are clear, and she does not exhibit any signs of volume overload. She reports compliance with her medications. No changes were made today. She will follow-up with Dr. Rubia Fuentes as scheduled and as needed. Javier Sy MSN, FNP-BC    Please note:  Portions of this chart were created with Dragon medical speech to text program.  Unrecognized errors may be present.

## 2022-05-19 ENCOUNTER — OFFICE VISIT (OUTPATIENT)
Dept: CARDIOLOGY CLINIC | Age: 62
End: 2022-05-19
Payer: COMMERCIAL

## 2022-05-19 VITALS
OXYGEN SATURATION: 97 % | SYSTOLIC BLOOD PRESSURE: 100 MMHG | DIASTOLIC BLOOD PRESSURE: 72 MMHG | HEART RATE: 72 BPM | WEIGHT: 237 LBS | BODY MASS INDEX: 38.09 KG/M2 | HEIGHT: 66 IN

## 2022-05-19 DIAGNOSIS — R93.1 ABNORMAL NUCLEAR CARDIAC IMAGING TEST: Primary | ICD-10-CM

## 2022-05-19 DIAGNOSIS — R07.9 CHEST PAIN, UNSPECIFIED TYPE: ICD-10-CM

## 2022-05-19 DIAGNOSIS — E78.5 HYPERLIPIDEMIA, UNSPECIFIED HYPERLIPIDEMIA TYPE: ICD-10-CM

## 2022-05-19 PROCEDURE — 99214 OFFICE O/P EST MOD 30 MIN: CPT | Performed by: NURSE PRACTITIONER

## 2022-05-19 RX ORDER — ROSUVASTATIN CALCIUM 5 MG/1
TABLET, COATED ORAL 3 TIMES WEEKLY
COMMUNITY
Start: 2022-04-27

## 2022-05-19 RX ORDER — SEMAGLUTIDE 1.34 MG/ML
0.5 INJECTION, SOLUTION SUBCUTANEOUS
Qty: 1 EACH | Refills: 0
Start: 2022-05-19

## 2022-05-19 RX ORDER — LEVOTHYROXINE SODIUM 150 MCG
150 TABLET ORAL
Qty: 1 TABLET | Refills: 0
Start: 2022-05-19

## 2022-05-19 RX ORDER — SEMAGLUTIDE 1.34 MG/ML
INJECTION, SOLUTION SUBCUTANEOUS
COMMUNITY
Start: 2022-04-26 | End: 2022-05-19

## 2022-05-19 RX ORDER — ALBUTEROL SULFATE 90 UG/1
2 AEROSOL, METERED RESPIRATORY (INHALATION)
Qty: 18 G | Refills: 0
Start: 2022-05-19

## 2022-05-19 NOTE — PROGRESS NOTES
Laura Lyons presents today for   Chief Complaint   Patient presents with    Follow-up     Post Heart Cath follow up     Shortness of Breath     with exertion        Sonia Khan preferred language for health care discussion is english/other. Is someone accompanying this pt? no    Is the patient using any DME equipment during [address]? no    Depression Screening:  3 most recent PHQ Screens 5/19/2022   Little interest or pleasure in doing things Not at all   Feeling down, depressed, irritable, or hopeless Not at all   Total Score PHQ 2 0       Learning Assessment:  Learning Assessment 3/10/2022   PRIMARY LEARNER Patient   PRIMARY LANGUAGE ENGLISH   LEARNER PREFERENCE PRIMARY DEMONSTRATION   ANSWERED BY patient   RELATIONSHIP SELF       Abuse Screening:  Abuse Screening Questionnaire 5/19/2022   Do you ever feel afraid of your partner? N   Are you in a relationship with someone who physically or mentally threatens you? N   Is it safe for you to go home? Y       Fall Risk  No flowsheet data found. Pt currently taking Anticoagulant therapy? no    Coordination of Care:  1. Have you been to the ER, urgent care clinic since your last visit? Hospitalized since your last visit? no    2. Have you seen or consulted any other health care providers outside of the [address] [place] [address] since your last visit? Include any pap smears or colon screening.  no

## 2023-04-20 ENCOUNTER — OFFICE VISIT (OUTPATIENT)
Age: 63
End: 2023-04-20

## 2023-04-20 VITALS
HEART RATE: 83 BPM | HEIGHT: 66 IN | BODY MASS INDEX: 37.45 KG/M2 | OXYGEN SATURATION: 94 % | DIASTOLIC BLOOD PRESSURE: 74 MMHG | SYSTOLIC BLOOD PRESSURE: 100 MMHG | WEIGHT: 233 LBS

## 2023-04-20 DIAGNOSIS — E78.5 HYPERLIPIDEMIA, UNSPECIFIED HYPERLIPIDEMIA TYPE: Primary | ICD-10-CM

## 2023-04-20 DIAGNOSIS — R07.9 CHEST PAIN, UNSPECIFIED TYPE: ICD-10-CM

## 2023-04-20 RX ORDER — MECOBAL/LEVOMEFOLAT CA/B6 PHOS 2-3-35 MG
TABLET ORAL
COMMUNITY
Start: 2023-04-13

## 2023-04-20 RX ORDER — LANSOPRAZOLE 30 MG/1
1 CAPSULE, DELAYED RELEASE ORAL DAILY
COMMUNITY
Start: 2021-10-13

## 2023-04-20 NOTE — PROGRESS NOTES
History of Present Illness:  70 YOF here for follow up. Overall she is doing well. She started on Ozempic for diabetes and blood sugar is controlled and she has been losing weight. No new chest pain, dyspnea, PND, orthopnea or edema. Impression:  Abnormal stress test April 2022 with heart catheterization at the time with minimal epicardial disease, 20-30% atherosclerosis. Diabetes for 16 years. Hypertension. Dyslipidemia, on statin. History of asthma. Remote PE postoperatively 10/01/19. Thyroid disorder. History of sclerosing cholangitis with conservative management. Fibromyalgia. Plan:  I reviewed her heart catheterization from last year showing mild atherosclerosis, continue aspirin and statin, risk factor modification with control of diabetes. She has lost a little weight with Ozempic. All questions answered and I will see back annually.         Wt Readings from Last 3 Encounters:   04/20/23 233 lb (105.7 kg)   05/19/22 237 lb (107.5 kg)   04/20/22 236 lb (107 kg)     Past Medical History:   Diagnosis Date    Asthma     COPD (chronic obstructive pulmonary disease) (HCC)     Diabetes (HCC)     GERD (gastroesophageal reflux disease)     Heart murmur     Hypercholesterolemia     Rectal pain     discomfort.  small amount of blood on tissue off and on    RLS (restless legs syndrome)     Sleep apnea     Thyroid disease        Current Outpatient Medications   Medication Sig Dispense Refill    lansoprazole (PREVACID) 30 MG delayed release capsule Take 1 capsule by mouth daily      L-Methylfolate-B6-B12 3-35-2 MG TABS       albuterol sulfate HFA (PROVENTIL;VENTOLIN;PROAIR) 108 (90 Base) MCG/ACT inhaler Inhale 2 puffs into the lungs every 6 hours as needed      ARIPiprazole (ABILIFY) 5 MG tablet Take 1 tablet by mouth daily      aspirin 325 MG EC tablet Take 1 tablet by mouth daily      buPROPion (WELLBUTRIN XL) 300 MG extended release tablet Take 1 tablet by mouth daily      Cetirizine HCl (ZYRTEC ALLERGY)

## 2024-03-08 RX ORDER — FAMOTIDINE 40 MG/1
40 TABLET, FILM COATED ORAL 2 TIMES DAILY
COMMUNITY

## 2024-03-08 RX ORDER — PANTOPRAZOLE SODIUM 40 MG/1
40 TABLET, DELAYED RELEASE ORAL DAILY
COMMUNITY

## 2024-03-08 RX ORDER — INSULIN DEGLUDEC 100 U/ML
120 INJECTION, SOLUTION SUBCUTANEOUS DAILY
COMMUNITY

## 2024-03-08 NOTE — PROGRESS NOTES
Instructions for your procedure at [facility]      Today's Date: 3/8/2024      Patient's Name: Kaitlin Avalos      Procedure Date: March 18        Please enter the main entrance of the hospital and check-in at the  located in the lobby.      Do NOT eat or drink anything, including candy, gum, or ice chips after midnight prior to your procedure, unless it is part of your prep.  Brush your teeth before coming to the hospital.You may swish with water, but do not swallow.  No smoking/Vaping/E-Cigarettes 24 hours prior to the day of procedure.  No alcohol 24 hours prior to the day of procedure.  No recreational drugs for one week prior to the day of procedure.  Bring Photo ID, Insurance information, and Co-pay if required on day of procedure.  Bring in pertinent legal documents, such as, Medical Power of , DNR, Advance Directive, etc.  Leave all other valuables, including money/purse, at home.  Remove jewelry, including ALL body piercings, nail polish, acrylic nails, and makeup (including mascara); no lotions, powders, deodorant, and/or perfume/cologne/after shave on the skin.  Glasses and dentures may be worn to the hospital.  They must be removed prior to procedure. Please bring case/container for glasses or dentures.  11. Contacts should not be worn on day of procedure.   12. Call the office ([phone]) if you have symptoms of a cold or illness within 24-48 hours prior to your procedure.   13. AN ADULT (relative or friend 18 years or older) MUST DRIVE YOU HOME AFTER YOUR PROCEDURE.   14. Please make arrangements for a responsible adult (18 years or older) to be with you for 24 hours after your procedure.   15. ONE VISITOR will be allowed in the waiting area during your procedure.       Special Instructions:      Bring list of CURRENT medications.  Follow instructions from the office regarding Bowel Prep, Vitamins, Iron, Blood Thinners, Insulin, Seizure, and Blood

## 2024-03-14 ENCOUNTER — ANESTHESIA EVENT (OUTPATIENT)
Facility: HOSPITAL | Age: 64
End: 2024-03-14
Payer: MEDICARE

## 2024-03-18 ENCOUNTER — ANESTHESIA (OUTPATIENT)
Facility: HOSPITAL | Age: 64
End: 2024-03-18
Payer: MEDICARE

## 2024-03-18 ENCOUNTER — HOSPITAL ENCOUNTER (OUTPATIENT)
Facility: HOSPITAL | Age: 64
Setting detail: OUTPATIENT SURGERY
Discharge: HOME | End: 2024-03-18
Attending: INTERNAL MEDICINE | Admitting: INTERNAL MEDICINE
Payer: MEDICARE

## 2024-03-18 VITALS
HEART RATE: 94 BPM | BODY MASS INDEX: 35.77 KG/M2 | SYSTOLIC BLOOD PRESSURE: 114 MMHG | OXYGEN SATURATION: 94 % | DIASTOLIC BLOOD PRESSURE: 72 MMHG | HEIGHT: 66 IN | TEMPERATURE: 97.6 F | RESPIRATION RATE: 14 BRPM | WEIGHT: 222.6 LBS

## 2024-03-18 LAB
GLUCOSE BLD STRIP.AUTO-MCNC: 73 MG/DL (ref 70–110)
GLUCOSE BLD STRIP.AUTO-MCNC: 81 MG/DL (ref 70–110)

## 2024-03-18 PROCEDURE — 2500000003 HC RX 250 WO HCPCS: Performed by: NURSE ANESTHETIST, CERTIFIED REGISTERED

## 2024-03-18 PROCEDURE — 3600007502: Performed by: INTERNAL MEDICINE

## 2024-03-18 PROCEDURE — 2709999900 HC NON-CHARGEABLE SUPPLY: Performed by: INTERNAL MEDICINE

## 2024-03-18 PROCEDURE — 6360000002 HC RX W HCPCS: Performed by: NURSE ANESTHETIST, CERTIFIED REGISTERED

## 2024-03-18 PROCEDURE — 3700000001 HC ADD 15 MINUTES (ANESTHESIA): Performed by: INTERNAL MEDICINE

## 2024-03-18 PROCEDURE — 7100000010 HC PHASE II RECOVERY - FIRST 15 MIN: Performed by: INTERNAL MEDICINE

## 2024-03-18 PROCEDURE — 7100000000 HC PACU RECOVERY - FIRST 15 MIN: Performed by: INTERNAL MEDICINE

## 2024-03-18 PROCEDURE — 88305 TISSUE EXAM BY PATHOLOGIST: CPT

## 2024-03-18 PROCEDURE — 82962 GLUCOSE BLOOD TEST: CPT

## 2024-03-18 PROCEDURE — 2580000003 HC RX 258: Performed by: NURSE ANESTHETIST, CERTIFIED REGISTERED

## 2024-03-18 PROCEDURE — 3700000000 HC ANESTHESIA ATTENDED CARE: Performed by: INTERNAL MEDICINE

## 2024-03-18 PROCEDURE — 3600007512: Performed by: INTERNAL MEDICINE

## 2024-03-18 RX ORDER — DEXTROSE MONOHYDRATE 100 MG/ML
INJECTION, SOLUTION INTRAVENOUS CONTINUOUS PRN
Status: DISCONTINUED | OUTPATIENT
Start: 2024-03-18 | End: 2024-03-18 | Stop reason: HOSPADM

## 2024-03-18 RX ORDER — INSULIN LISPRO 100 [IU]/ML
1-15 INJECTION, SOLUTION INTRAVENOUS; SUBCUTANEOUS ONCE
Status: DISCONTINUED | OUTPATIENT
Start: 2024-03-18 | End: 2024-03-18 | Stop reason: HOSPADM

## 2024-03-18 RX ORDER — SODIUM CHLORIDE, SODIUM LACTATE, POTASSIUM CHLORIDE, CALCIUM CHLORIDE 600; 310; 30; 20 MG/100ML; MG/100ML; MG/100ML; MG/100ML
INJECTION, SOLUTION INTRAVENOUS CONTINUOUS
Status: DISCONTINUED | OUTPATIENT
Start: 2024-03-18 | End: 2024-03-18 | Stop reason: HOSPADM

## 2024-03-18 RX ORDER — LIDOCAINE HYDROCHLORIDE 10 MG/ML
1 INJECTION, SOLUTION EPIDURAL; INFILTRATION; INTRACAUDAL; PERINEURAL
Status: DISCONTINUED | OUTPATIENT
Start: 2024-03-18 | End: 2024-03-18 | Stop reason: HOSPADM

## 2024-03-18 RX ORDER — PROPOFOL 10 MG/ML
INJECTION, EMULSION INTRAVENOUS PRN
Status: DISCONTINUED | OUTPATIENT
Start: 2024-03-18 | End: 2024-03-18 | Stop reason: SDUPTHER

## 2024-03-18 RX ORDER — LIDOCAINE HYDROCHLORIDE 20 MG/ML
INJECTION, SOLUTION EPIDURAL; INFILTRATION; INTRACAUDAL; PERINEURAL PRN
Status: DISCONTINUED | OUTPATIENT
Start: 2024-03-18 | End: 2024-03-18 | Stop reason: SDUPTHER

## 2024-03-18 RX ADMIN — LIDOCAINE HYDROCHLORIDE 40 MG: 20 INJECTION, SOLUTION EPIDURAL; INFILTRATION; INTRACAUDAL; PERINEURAL at 08:27

## 2024-03-18 RX ADMIN — PROPOFOL 30 MG: 10 INJECTION, EMULSION INTRAVENOUS at 08:29

## 2024-03-18 RX ADMIN — SODIUM CHLORIDE, POTASSIUM CHLORIDE, SODIUM LACTATE AND CALCIUM CHLORIDE: 600; 310; 30; 20 INJECTION, SOLUTION INTRAVENOUS at 07:51

## 2024-03-18 RX ADMIN — PROPOFOL 50 MG: 10 INJECTION, EMULSION INTRAVENOUS at 08:32

## 2024-03-18 RX ADMIN — PROPOFOL 70 MG: 10 INJECTION, EMULSION INTRAVENOUS at 08:27

## 2024-03-18 ASSESSMENT — PAIN - FUNCTIONAL ASSESSMENT: PAIN_FUNCTIONAL_ASSESSMENT: 0-10

## 2024-03-18 NOTE — DISCHARGE INSTRUCTIONS
early, when treatment works best.  Speak with your physician about when you should begin screening and how often you should be tested.              [employer] Activation    Thank you for requesting access to ServiceNow. Please follow the instructions below to securely access and download your online medical record. ServiceNow allows you to send messages to your doctor, view your test results, renew your prescriptions, schedule appointments, and more.    How Do I Sign Up?    In your internet browser, go to [email or web address].  Click on the First Time User? Click Here link in the Sign In box. You will see the New Member Sign Up page.  Enter your [employer] Access Code exactly as it appears below. You will not need to use this code after you’ve completed the sign-up process. If you do not sign up before the expiration date, you must request a new code.    ServiceNow Access Code: Activation code not generated  Current ServiceNow Status: Active (This is the date your ServiceNow access code will )    Enter the last four digits of your Social Security Number (xxxx) and Date of Birth (mm/dd/yyyy) as indicated and click Submit. You will be taken to the next sign-up page.  Create a ServiceNow ID. This will be your ServiceNow login ID and cannot be changed, so think of one that is secure and easy to remember.  Create a ServiceNow password. You can change your password at any time.  Enter your Password Reset Question and Answer. This can be used at a later time if you forget your password.   Enter your e-mail address. You will receive e-mail notification when new information is available in ServiceNow.  Click Sign Up. You can now view and download portions of your medical record.  Click the Download Summary menu link to download a portable copy of your medical information.    Additional Information    If you have questions, please call [phone]. Remember, ServiceNow is NOT to be used for urgent needs. For medical emergencies,
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
## 2024-03-18 NOTE — ANESTHESIA POSTPROCEDURE EVALUATION
Department of Anesthesiology  Postprocedure Note    Patient: Kaitlin Avalos  MRN: [id number]  YOB: 1960  Date of evaluation: 3/18/2024    Procedure Summary       Date: 03/18/24 Room / Location: [facility] ENDO 02 / [facility] ENDOSCOPY    Anesthesia Start: 0817 Anesthesia Stop: 0836    Procedure: ESOPHAGOGASTRODUODENOSCOPY w/Biopsies and 50 Fr Dilation (Upper GI Region) Diagnosis:       Abdominal pain, epigastric      Primary sclerosing cholangitis      Over weight      Constipation, unspecified constipation type      Colon cancer screening      (Abdominal pain, epigastric [R10.13])      (Primary sclerosing cholangitis [K83.01])      (Over weight [E66.3])      (Constipation, unspecified constipation type [K59.00])      (Colon cancer screening [Z12.11])    Surgeons: Galo Mccoy MD Responsible Provider: Emily Cano MD    Anesthesia Type: MAC ASA Status: 3            Anesthesia Type: MAC    Chao Phase I: Chao Score: 10    Chao Phase II: Chao Score: 10    Anesthesia Post Evaluation    Patient location during evaluation: bedside  Patient participation: complete - patient participated  Airway patency: patent  Cardiovascular status: hemodynamically stable  Respiratory status: acceptable  Hydration status: stable    No notable events documented.

## 2024-03-18 NOTE — H&P
8/22/2021, Peripheral biliary duct dilation in both hepatic lobes w/ central strictures. Sclerosing cholangitis likely. No findings of parenchymal fibrosis, no mass lesion  Seen recently in the ER or hospital, if so where?, 04/05/2021   Medications: Abilify 5 mg Take 1 tablet by mouth once a day  Noreen Aspirin 325 mg Take 1 tablet by mouth once a day  ezetimibe 10 mg Take 1 tablet by mouth once a day  famotidine 40 mg Take 1 tablet by mouth twice a day  Farxiga 10 mg Take 1 tablet by mouth once a day  fluoxetine 40 mg TAKE ONE CAPSULE BY MOUTH ONCE DAILY  fluticasone propionate 50 mcg/actuation Inhale 1 spray into both nostrils once a day  Linzess 145 mcg Take one capsule each day on an empty stomach at least 30 minutes prior to a meal at approximately the same time each day.  meloxicam 15 mg Take 1 tablet by mouth once a day  metoclopramide HCl 10 mg TAKE 1 TABLET BY MOUTH BEFORE MEALS AND AT BEDTIME  pantoprazole 40 mg TAKE 1 TABLET BY MOUTH DAILY BEFORE BREAKFAST  Prozac 40 mg Take 1 capsule by mouth once a day  Requip 5 mg Take 1 tablet by mouth once a day  ropinirole 1 mg TAKE 1 TABLET BY MOUTH DAILY IN THE EVENING  rosuvastatin 5 mg TAKE 1 TABLET BY MOUTH 3 TIMES A WEEK  Synthroid 150 mcg Take 1 tablet by mouth once a day  trazodone 100 mg Take 1 tablet by mouth once a day  Tresiba FlexTouch U-100 100 unit/mL (3 mL) Inject 80 unit subcutaneously once a day  ursodiol 500 mg TAKE 1 TABLET BY MOUTH THREE TIMES DAILY  Ventolin HFA 90 mcg/actuation 2 puffs every 4-6 hours prn  Wellbutrin  mg Take 1 tablet by mouth once a day  Zyrtec-D 5-120 mg Take 1 tablet by mouth once a day   Allergies: sucralfate - unknown  Sulfa (Sulfonamide Antibiotics) - vomiting   Immunizations: pneumococcal polysaccharide PPV23, 2023  Covid, 03/26/2021,04/22/2021  Influenza, seasonal, injectable, 09/2020, 2023      Social History      Alcohol: Rum 1 times a month.liquor rarely.Less than 7 per week.   Tobacco: Former

## 2025-01-29 ENCOUNTER — TELEPHONE (OUTPATIENT)
Facility: CLINIC | Age: 65
End: 2025-01-29

## 2025-01-29 NOTE — TELEPHONE ENCOUNTER
Spoke w/pt and she said that she found out her insurance is out of network with Dr. Huffman so she doesn't need appt.

## 2025-01-29 NOTE — TELEPHONE ENCOUNTER
----- Message from IOANA COBB LPN sent at 1/29/2025 11:23 AM EST -----  Regarding: FW: [facility] Appointment Request    ----- Message -----  From: Brad Paige  Sent: 1/29/2025  10:40 AM EST  To: Dominic Hernandez [facility] Clinical Staff  Subject: E[facility] Appointment Request                          E[facility] Appointment Request    Patient needs appointment for E[facility] Appointment Type: New to Provider.    Patient Requested Dates(s): March or April  Patient Requested Time: Morning or the first available  Provider Name: Chantelle Huffman MD    Reason for Appointment Request: New Patient - Requested Provider unavailable. Pt would like to see her, the available appt is not populating in the system.  --------------------------------------------------------------------------------------------------------------------------    Relationship to Patient: Self     Call Back Information: OK to leave message on voicemail  Preferred Call Back Number: Phone [phone] (home)

## 2025-03-11 RX ORDER — INSULIN GLARGINE 100 [IU]/ML
80 INJECTION, SOLUTION SUBCUTANEOUS NIGHTLY
COMMUNITY

## 2025-03-12 ENCOUNTER — ANESTHESIA EVENT (OUTPATIENT)
Age: 65
End: 2025-03-12
Payer: MEDICARE

## 2025-03-12 RX ORDER — SODIUM CHLORIDE 9 MG/ML
INJECTION, SOLUTION INTRAVENOUS PRN
Status: CANCELLED | OUTPATIENT
Start: 2025-03-12

## 2025-03-12 RX ORDER — SODIUM CHLORIDE 0.9 % (FLUSH) 0.9 %
5-40 SYRINGE (ML) INJECTION EVERY 12 HOURS SCHEDULED
Status: CANCELLED | OUTPATIENT
Start: 2025-03-12

## 2025-03-12 RX ORDER — SODIUM CHLORIDE 0.9 % (FLUSH) 0.9 %
5-40 SYRINGE (ML) INJECTION PRN
Status: CANCELLED | OUTPATIENT
Start: 2025-03-12

## 2025-03-12 RX ORDER — ALBUTEROL SULFATE 0.83 MG/ML
2.5 SOLUTION RESPIRATORY (INHALATION) AS NEEDED
Status: CANCELLED | OUTPATIENT
Start: 2025-03-12

## 2025-03-12 RX ORDER — SODIUM CHLORIDE, SODIUM LACTATE, POTASSIUM CHLORIDE, CALCIUM CHLORIDE 600; 310; 30; 20 MG/100ML; MG/100ML; MG/100ML; MG/100ML
INJECTION, SOLUTION INTRAVENOUS CONTINUOUS
Status: CANCELLED | OUTPATIENT
Start: 2025-03-12

## 2025-03-12 NOTE — DISCHARGE INSTRUCTIONS
POST-OPERATIVE INSTRUCTIONS FOR CATARACT SURGERY     1. No heavy lifting (no more than 5 pounds). No Bending at waist and No strenuous activity for one (1) week.     2. DO NOT RUB YOUR EYE!!! Wear eye protection at all times when going outdoors (glasses, sunglasses,        ect) and wear shield while sleeping/napping for the first week after surgery.      3. DO NOT GET WATER IN YOUR EYES FOR THE NEXT 7 DAYS. You may gently clean your face and you        may shower, but don't put any pressure on the eye. Ladies, no eye makeup for one (1) week after surgery.        Do not swim or get into a hot tub or pool for three (3) weeks.     4. You may experience eye irritation, aching, itching and blurred vision for several days. You may use over the         Counter PRESERVATIVE FREE Refresh or Systane as needed.  Use Tylenol or Ibuprofen (Motrin) for         Discomfort but DO NOT RUB YOUR EYE .     5. Call your doctor with any increased pain, redness, nausea, vomiting, fever, or worsening vision. Also call your doctor        with new flashes of light, many new floaters or a curtain/veil coming into your vision.                                               WASH YOUR HANDS BEFORE PUTTING IN YOUR DROPS.                                       ALLOW 5 MINUTES BETWEEN DIFFERENT DROPS.                          IF YOU HAVE ANY QUESTION OR CONCERNS:     DURING OFFICE HOURS CALL [phone]   OR AFTER HOURS / WEEKENDS CALL OR TEXT [phone]     If you are unable to reach us, call [phone] or go to the nearest Emergency Department

## 2025-03-24 ENCOUNTER — ANESTHESIA EVENT (OUTPATIENT)
Age: 65
End: 2025-03-24
Payer: MEDICARE

## 2025-03-26 ENCOUNTER — ANESTHESIA (OUTPATIENT)
Age: 65
End: 2025-03-26
Payer: MEDICARE

## 2025-03-26 ENCOUNTER — HOSPITAL ENCOUNTER (OUTPATIENT)
Age: 65
Setting detail: OUTPATIENT SURGERY
Discharge: HOME | End: 2025-03-26
Attending: OPHTHALMOLOGY | Admitting: OPHTHALMOLOGY
Payer: MEDICARE

## 2025-03-26 VITALS
DIASTOLIC BLOOD PRESSURE: 81 MMHG | BODY MASS INDEX: 31.98 KG/M2 | HEIGHT: 66 IN | SYSTOLIC BLOOD PRESSURE: 119 MMHG | RESPIRATION RATE: 16 BRPM | TEMPERATURE: 98 F | HEART RATE: 70 BPM | WEIGHT: 199 LBS | OXYGEN SATURATION: 92 %

## 2025-03-26 LAB
CHP ED QC CHECK: NORMAL
GLUCOSE BLD-MCNC: 184 MG/DL

## 2025-03-26 PROCEDURE — 6360000002 HC RX W HCPCS: Performed by: NURSE ANESTHETIST, CERTIFIED REGISTERED

## 2025-03-26 PROCEDURE — 2709999900 HC NON-CHARGEABLE SUPPLY: Performed by: OPHTHALMOLOGY

## 2025-03-26 PROCEDURE — 3600000002 HC SURGERY LEVEL 2 BASE: Performed by: OPHTHALMOLOGY

## 2025-03-26 PROCEDURE — 3700000001 HC ADD 15 MINUTES (ANESTHESIA): Performed by: OPHTHALMOLOGY

## 2025-03-26 PROCEDURE — V2632 POST CHMBR INTRAOCULAR LENS: HCPCS | Performed by: OPHTHALMOLOGY

## 2025-03-26 PROCEDURE — 3600000012 HC SURGERY LEVEL 2 ADDTL 15MIN: Performed by: OPHTHALMOLOGY

## 2025-03-26 PROCEDURE — 7100000010 HC PHASE II RECOVERY - FIRST 15 MIN: Performed by: OPHTHALMOLOGY

## 2025-03-26 PROCEDURE — 6360000002 HC RX W HCPCS: Performed by: OPHTHALMOLOGY

## 2025-03-26 PROCEDURE — 2500000003 HC RX 250 WO HCPCS: Performed by: OPHTHALMOLOGY

## 2025-03-26 PROCEDURE — 6370000000 HC RX 637 (ALT 250 FOR IP): Performed by: OPHTHALMOLOGY

## 2025-03-26 PROCEDURE — 3700000000 HC ANESTHESIA ATTENDED CARE: Performed by: OPHTHALMOLOGY

## 2025-03-26 DEVICE — TECNIS SIMPLICITY TECNIS EYHANCE U 17.5D
Type: IMPLANTABLE DEVICE | Site: EYE | Status: FUNCTIONAL
Brand: TECNIS SIMPLICITY

## 2025-03-26 RX ORDER — TRIAMCINOLONE ACETONIDE 40 MG/ML
INJECTION, SUSPENSION INTRA-ARTICULAR; INTRAMUSCULAR PRN
Status: DISCONTINUED | OUTPATIENT
Start: 2025-03-26 | End: 2025-03-26 | Stop reason: ALTCHOICE

## 2025-03-26 RX ORDER — MIDAZOLAM HYDROCHLORIDE 1 MG/ML
INJECTION, SOLUTION INTRAMUSCULAR; INTRAVENOUS
Status: DISCONTINUED | OUTPATIENT
Start: 2025-03-26 | End: 2025-03-26 | Stop reason: SDUPTHER

## 2025-03-26 RX ORDER — SODIUM CHLORIDE 9 MG/ML
INJECTION, SOLUTION INTRAVENOUS PRN
Status: CANCELLED | OUTPATIENT
Start: 2025-03-26

## 2025-03-26 RX ORDER — EPINEPHRINE 1 MG/ML(1)
AMPUL (ML) INJECTION PRN
Status: DISCONTINUED | OUTPATIENT
Start: 2025-03-26 | End: 2025-03-26 | Stop reason: ALTCHOICE

## 2025-03-26 RX ORDER — MOXIFLOXACIN 5 MG/ML
SOLUTION/ DROPS OPHTHALMIC PRN
Status: DISCONTINUED | OUTPATIENT
Start: 2025-03-26 | End: 2025-03-26 | Stop reason: ALTCHOICE

## 2025-03-26 RX ORDER — TETRACAINE HYDROCHLORIDE 5 MG/ML
SOLUTION OPHTHALMIC PRN
Status: DISCONTINUED | OUTPATIENT
Start: 2025-03-26 | End: 2025-03-26 | Stop reason: ALTCHOICE

## 2025-03-26 RX ADMIN — Medication: at 08:26

## 2025-03-26 RX ADMIN — MIDAZOLAM 2 MG: 1 INJECTION INTRAMUSCULAR; INTRAVENOUS at 08:55

## 2025-03-26 ASSESSMENT — PAIN - FUNCTIONAL ASSESSMENT
PAIN_FUNCTIONAL_ASSESSMENT: 0-10
PAIN_FUNCTIONAL_ASSESSMENT: NONE - DENIES PAIN

## 2025-03-26 ASSESSMENT — COPD QUESTIONNAIRES: CAT_SEVERITY: NO INTERVAL CHANGE

## 2025-03-26 NOTE — ANESTHESIA POSTPROCEDURE EVALUATION
Department of Anesthesiology  Postprocedure Note    Patient: Kaitlin Avalos  MRN: [id number]  YOB: 1960  Date of evaluation: 3/26/2025    Procedure Summary       Date: 03/26/25 Room / Location: [facility] 02 / [facility] MAIN OR    Anesthesia Start: 0853 Anesthesia Stop: 0915    Procedure: PHACO IOL OS (Left: Eye) Diagnosis:       Nuclear sclerotic cataract of left eye      (Nuclear sclerotic cataract of left eye [H25.12])    Surgeons: Vanessa Arroyo MD Responsible Provider: Johanna Grider APRN - CRNA    Anesthesia Type: MAC ASA Status: 3            Anesthesia Type: MAC    Chao Phase I: Chao Score: 10    Chao Phase II:      Anesthesia Post Evaluation    Patient location during evaluation: PACU  Patient participation: complete - patient participated  Level of consciousness: awake and alert  Pain score: 0  Airway patency: patent  Nausea & Vomiting: no nausea and no vomiting  Cardiovascular status: hemodynamically stable  Respiratory status: spontaneous ventilation and room air  Hydration status: stable  Pain management: adequate    No notable events documented.

## 2025-03-26 NOTE — ANESTHESIA PRE PROCEDURE
Department of Anesthesiology  Preprocedure Note       Name:  Kaitlin Avalos   Age:  64 y.o.  :  1960                                          MRN:  [id number]         Date:  3/26/2025      Surgeon: Surgeon(s):  Vanessa Arroyo MD    Procedure: Procedure(s):  PHACO IOL OS    Medications prior to admission:   Prior to Admission medications    Medication Sig Start Date End Date Taking? Authorizing Provider   insulin glargine (LANTUS) 100 UNIT/ML injection vial Inject 80 Units into the skin nightly   Yes ProviderTyrone MD   insulin lispro protamine & lispro (HUMALOG MIX) (75-25) 100 UNIT per ML SUSP injection vial Inject 12 Units into the skin 3 times daily (before meals)   Yes ProviderTyrone MD   linaclotide (LINZESS) 145 MCG capsule Take 1 capsule by mouth nightly   Yes Tyrone Montanez MD   famotidine (PEPCID) 40 MG tablet Take 1 tablet by mouth 2 times daily   Yes Tyrone Montanez MD   pantoprazole (PROTONIX) 40 MG tablet Take 1 tablet by mouth daily   Yes Provider, MD Tyrone   albuterol sulfate HFA (PROVENTIL;VENTOLIN;PROAIR) 108 (90 Base) MCG/ACT inhaler Inhale 2 puffs into the lungs every 6 hours as needed 22  Yes Automatic Reconciliation, Ar   ARIPiprazole (ABILIFY) 5 MG tablet Take 1 tablet by mouth daily 22  Yes Automatic Reconciliation, Ar   aspirin 325 MG EC tablet Take 81 mg by mouth daily   Yes Automatic Reconciliation, Ar   buPROPion (WELLBUTRIN XL) 300 MG extended release tablet Take 1 tablet by mouth daily 10/13/21  Yes Automatic Reconciliation, Ar   Cetirizine HCl (ZYRTEC ALLERGY) 10 MG CAPS Take 10 mg by mouth nightly 10/13/21  Yes Automatic Reconciliation, Ar   dapagliflozin (FARXIGA) 10 MG tablet Take 1 tablet by mouth daily 22  Yes Automatic Reconciliation, Ar   ezetimibe (ZETIA) 10 MG tablet Take 1 tablet by mouth daily 22  Yes Automatic Reconciliation, Ar   FLUoxetine (PROZAC) 40 MG capsule Take 1 capsule by mouth daily 22  Yes

## 2025-03-26 NOTE — H&P
Day of Surgery H&P:  Kaitlin Avalos was seen and examined.  There have been no significant clinical changes since the completion of the exam in the office.  Pt continues to complain of persistent poor vision and/or glare in planned operative eye affecting ability to perform basic ADLs (such as reading or driving at night).        Past Medical History:   Diagnosis Date    Asthma     seasonal asthma    Castleman's disease (HCC)     involves lymph nodes-hematology    COPD (chronic obstructive pulmonary disease) (HCC)     denies 3/8/24    COVID-19 02/26/2024    home test    Depression     Diabetes (HCC)     GERD (gastroesophageal reflux disease)     Heart murmur     Hypercholesterolemia     Primary sclerosing cholangitis (HCC)     Pulmonary embolism (HCC) 2017    Rectal pain     discomfort.  small amount of blood on tissue off and on    RLS (restless legs syndrome)     Sleep apnea     cpap    Thyroid disease     hypothyroid       Family Medical History: Non-contributory    HOME MED LIST:  Prior to Admission medications    Medication Sig Start Date End Date Taking? Authorizing Provider   insulin glargine (LANTUS) 100 UNIT/ML injection vial Inject 80 Units into the skin nightly   Yes Tyrone Montanez MD   insulin lispro protamine & lispro (HUMALOG MIX) (75-25) 100 UNIT per ML SUSP injection vial Inject 12 Units into the skin 3 times daily (before meals)   Yes ProviderTyrone MD   linaclotide (LINZESS) 145 MCG capsule Take 1 capsule by mouth nightly   Yes ProviderTyrone MD   famotidine (PEPCID) 40 MG tablet Take 1 tablet by mouth 2 times daily   Yes Tyrone Montanez MD   pantoprazole (PROTONIX) 40 MG tablet Take 1 tablet by mouth daily   Yes ProviderTyrone MD   albuterol sulfate HFA (PROVENTIL;VENTOLIN;PROAIR) 108 (90 Base) MCG/ACT inhaler Inhale 2 puffs into the lungs every 6 hours as needed 5/19/22  Yes Automatic Reconciliation, Ar   ARIPiprazole (ABILIFY) 5 MG tablet Take 1 tablet by  mouth daily 2/2/22  Yes Automatic Reconciliation, Ar   aspirin 325 MG EC tablet Take 81 mg by mouth daily   Yes Automatic Reconciliation, Ar   buPROPion (WELLBUTRIN XL) 300 MG extended release tablet Take 1 tablet by mouth daily 10/13/21  Yes Automatic Reconciliation, Ar   Cetirizine HCl (ZYRTEC ALLERGY) 10 MG CAPS Take 10 mg by mouth nightly 10/13/21  Yes Automatic Reconciliation, Ar   dapagliflozin (FARXIGA) 10 MG tablet Take 1 tablet by mouth daily 2/2/22  Yes Automatic Reconciliation, Ar   ezetimibe (ZETIA) 10 MG tablet Take 1 tablet by mouth daily 2/2/22  Yes Automatic Reconciliation, Ar   FLUoxetine (PROZAC) 40 MG capsule Take 1 capsule by mouth daily 2/2/22  Yes Automatic Reconciliation, Ar   fluticasone (FLONASE) 50 MCG/ACT nasal spray 2 sprays by Nasal route daily 2/2/22  Yes Automatic Reconciliation, Ar   levothyroxine (SYNTHROID) 150 MCG tablet Take 1 tablet by mouth every morning (before breakfast) 5/19/22  Yes Automatic Reconciliation, Ar   meloxicam (MOBIC) 15 MG tablet Take 1 tablet by mouth daily 10/13/21  Yes Automatic Reconciliation, Ar   rOPINIRole (REQUIP) 1 MG tablet Take 1 tablet by mouth nightly 10/13/21  Yes Automatic Reconciliation, Ar   rosuvastatin (CRESTOR) 5 MG tablet Take 1 tablet by mouth three times a week M-W-F am 4/27/22  Yes Automatic Reconciliation, Ar   traZODone (DESYREL) 100 MG tablet Take 1 tablet by mouth nightly   Yes Automatic Reconciliation, Ar   ursodiol (ACTIGALL) 500 MG tablet Take 1 tablet by mouth 2 times daily Takes 750mg  nightly 2/8/22  Yes Automatic Reconciliation, Ar       Allergies   Allergen Reactions    Statins Other (See Comments) and Myalgia    Sulfa Antibiotics      Other reaction(s): Unknown (comments)    Sucralfate Nausea And Vomiting       Review of Systems  Constitutional:  Negative for chills and fever.  HENT:  Negative for congestion and sore throat.    Eyes:  Positive for blurred vision.  Respiratory:  Negative for cough and shortness of breath.

## 2025-03-26 NOTE — DISCHARGE INSTRUCTIONS
POST-OPERATIVE INSTRUCTIONS FOR CATARACT SURGERY     1. No heavy lifting (no more than 5 pounds). No Bending at waist and No strenuous activity for one (1) week.     2. DO NOT RUB YOUR EYE!!! Wear eye protection at all times when going outdoors (glasses, sunglasses,        ect) and wear shield while sleeping/napping for the first week after surgery.      3. DO NOT GET WATER IN YOUR EYES FOR THE NEXT 7 DAYS. You may gently clean your face and you        may shower, but don't put any pressure on the eye. Ladies, no eye makeup for one (1) week after surgery.        Do not swim or get into a hot tub or pool for three (3) weeks.     4. You may experience eye irritation, aching, itching and blurred vision for several days. You may use over the         Counter PRESERVATIVE FREE Refresh or Systane as needed.  Use Tylenol or Ibuprofen (Motrin) for         Discomfort but DO NOT RUB YOUR EYE .     5. Call your doctor with any increased pain, redness, nausea, vomiting, fever, or worsening vision. Also call your doctor        with new flashes of light, many new floaters or a curtain/veil coming into your vision.                                               WASH YOUR HANDS BEFORE PUTTING IN YOUR DROPS.                                       ALLOW 5 MINUTES BETWEEN DIFFERENT DROPS.                          IF YOU HAVE ANY QUESTION OR CONCERNS:     DURING OFFICE HOURS CALL [phone]   OR AFTER HOURS / WEEKENDS CALL OR TEXT [phone]     If you are unable to reach us, call [phone] or go to the nearest Emergency Department

## 2025-03-26 NOTE — OP NOTE
OPERATIVE REPORT    PATIENT INFORMATION:    Patient: Kaitlin PhillipsRN: [id number] SSN: [id number]  YOB: 1960 Age: 64 y.o. Sex: female      LOCATION OF SURGERY: [address] [id number] [phone]  DATE OF SURGERY:  03/26/25      PRE-OPERATIVE DIAGNOSIS: Cataract Left eye.  POST OPERATIVE DIAGNOSIS: Cataract Left eye.      PROCEDURE PERFORMED: Phacoemulsification with intraocular lens Left eye.  SURGEON: Vanessa Arroyo M.D.  ANESTHESIA: Monitored anesthesia.  COMPLICATIONS: None.  BLOOD LOSS: None  SPECIMEN: None      INDICATIONS FOR PROCEDURE:  This is a 64 y.o. year old female with progressively decreasing vision in the left eye. Risks, benefits and alternatives of surgery were explained at length with the patient and informed consent was obtained.       PROCEDURE:  The patient was met in the pre-operative holding area where confirmation was made that the left eye was to be operated on and surgical eye was marked. The patient was taken to the operating room where anesthesia staff was present to give temporary sedation. Following the instillation of topical tetracaine drops to the operative eye the patient was then prepped and draped in the usual sterile fashion for ophthalmic surgery. The lid speculum was placed and the lids were retracted.  A paracentesis was made through the clear cornea, shugarcaine was injected in the eye for improved dilation. The anterior chamber was deepened with viscoelastic material. A 2.4 mm keratome was used to make a self-sealing clear corneal incision. Capsulorrhexis was initiated with a cystotome and completed with Utrata forceps without difficulty. Hydrodissection was completed and good fluid wave was visualized. Phacoemulsification was initiated and completed in a divide and conquer fashion without difficulty. Irrigation and aspiration was used to remove the residual cortical material from the capsular bag and then the

## 2025-04-09 ENCOUNTER — ANESTHESIA (OUTPATIENT)
Age: 65
End: 2025-04-09
Payer: MEDICARE

## 2025-04-09 ENCOUNTER — HOSPITAL ENCOUNTER (OUTPATIENT)
Age: 65
Setting detail: OUTPATIENT SURGERY
Discharge: HOME | End: 2025-04-09
Attending: OPHTHALMOLOGY | Admitting: OPHTHALMOLOGY
Payer: MEDICARE

## 2025-04-09 VITALS
RESPIRATION RATE: 18 BRPM | DIASTOLIC BLOOD PRESSURE: 85 MMHG | HEART RATE: 74 BPM | TEMPERATURE: 98.5 F | SYSTOLIC BLOOD PRESSURE: 107 MMHG | OXYGEN SATURATION: 93 %

## 2025-04-09 LAB
GLUCOSE BLD STRIP.AUTO-MCNC: 159 MG/DL (ref 70–110)
PERFORMED BY:: ABNORMAL

## 2025-04-09 PROCEDURE — 3700000001 HC ADD 15 MINUTES (ANESTHESIA): Performed by: OPHTHALMOLOGY

## 2025-04-09 PROCEDURE — 2500000003 HC RX 250 WO HCPCS: Performed by: OPHTHALMOLOGY

## 2025-04-09 PROCEDURE — 3600000002 HC SURGERY LEVEL 2 BASE: Performed by: OPHTHALMOLOGY

## 2025-04-09 PROCEDURE — 82962 GLUCOSE BLOOD TEST: CPT

## 2025-04-09 PROCEDURE — 3600000012 HC SURGERY LEVEL 2 ADDTL 15MIN: Performed by: OPHTHALMOLOGY

## 2025-04-09 PROCEDURE — 7100000010 HC PHASE II RECOVERY - FIRST 15 MIN: Performed by: OPHTHALMOLOGY

## 2025-04-09 PROCEDURE — 3700000000 HC ANESTHESIA ATTENDED CARE: Performed by: OPHTHALMOLOGY

## 2025-04-09 PROCEDURE — 6360000002 HC RX W HCPCS: Performed by: NURSE ANESTHETIST, CERTIFIED REGISTERED

## 2025-04-09 PROCEDURE — 2709999900 HC NON-CHARGEABLE SUPPLY: Performed by: OPHTHALMOLOGY

## 2025-04-09 PROCEDURE — 6360000002 HC RX W HCPCS: Performed by: OPHTHALMOLOGY

## 2025-04-09 PROCEDURE — V2632 POST CHMBR INTRAOCULAR LENS: HCPCS | Performed by: OPHTHALMOLOGY

## 2025-04-09 PROCEDURE — 6370000000 HC RX 637 (ALT 250 FOR IP): Performed by: OPHTHALMOLOGY

## 2025-04-09 DEVICE — TECNIS SIMPLICITY TECNIS EYHANCE U 18.5D
Type: IMPLANTABLE DEVICE | Site: EYE | Status: FUNCTIONAL
Brand: TECNIS SIMPLICITY

## 2025-04-09 RX ORDER — NALOXONE HYDROCHLORIDE 0.4 MG/ML
INJECTION, SOLUTION INTRAMUSCULAR; INTRAVENOUS; SUBCUTANEOUS PRN
Status: DISCONTINUED | OUTPATIENT
Start: 2025-04-09 | End: 2025-04-09 | Stop reason: HOSPADM

## 2025-04-09 RX ORDER — SODIUM CHLORIDE 0.9 % (FLUSH) 0.9 %
5-40 SYRINGE (ML) INJECTION EVERY 12 HOURS SCHEDULED
Status: DISCONTINUED | OUTPATIENT
Start: 2025-04-09 | End: 2025-04-09 | Stop reason: HOSPADM

## 2025-04-09 RX ORDER — SODIUM CHLORIDE 0.9 % (FLUSH) 0.9 %
5-40 SYRINGE (ML) INJECTION PRN
Status: DISCONTINUED | OUTPATIENT
Start: 2025-04-09 | End: 2025-04-09 | Stop reason: HOSPADM

## 2025-04-09 RX ORDER — SODIUM CHLORIDE, SODIUM LACTATE, POTASSIUM CHLORIDE, CALCIUM CHLORIDE 600; 310; 30; 20 MG/100ML; MG/100ML; MG/100ML; MG/100ML
INJECTION, SOLUTION INTRAVENOUS CONTINUOUS
Status: DISCONTINUED | OUTPATIENT
Start: 2025-04-09 | End: 2025-04-09 | Stop reason: HOSPADM

## 2025-04-09 RX ORDER — EPINEPHRINE 1 MG/ML(1)
AMPUL (ML) INJECTION PRN
Status: DISCONTINUED | OUTPATIENT
Start: 2025-04-09 | End: 2025-04-09 | Stop reason: ALTCHOICE

## 2025-04-09 RX ORDER — MIDAZOLAM HYDROCHLORIDE 1 MG/ML
INJECTION, SOLUTION INTRAMUSCULAR; INTRAVENOUS
Status: DISCONTINUED | OUTPATIENT
Start: 2025-04-09 | End: 2025-04-09 | Stop reason: SDUPTHER

## 2025-04-09 RX ORDER — TETRACAINE HYDROCHLORIDE 5 MG/ML
SOLUTION OPHTHALMIC PRN
Status: DISCONTINUED | OUTPATIENT
Start: 2025-04-09 | End: 2025-04-09 | Stop reason: ALTCHOICE

## 2025-04-09 RX ORDER — MOXIFLOXACIN 5 MG/ML
SOLUTION/ DROPS OPHTHALMIC PRN
Status: DISCONTINUED | OUTPATIENT
Start: 2025-04-09 | End: 2025-04-09 | Stop reason: ALTCHOICE

## 2025-04-09 RX ORDER — TRIAMCINOLONE ACETONIDE 40 MG/ML
INJECTION, SUSPENSION INTRA-ARTICULAR; INTRAMUSCULAR PRN
Status: DISCONTINUED | OUTPATIENT
Start: 2025-04-09 | End: 2025-04-09 | Stop reason: ALTCHOICE

## 2025-04-09 RX ADMIN — Medication: at 07:47

## 2025-04-09 RX ADMIN — MIDAZOLAM 2 MG: 1 INJECTION INTRAMUSCULAR; INTRAVENOUS at 08:31

## 2025-04-09 ASSESSMENT — PAIN - FUNCTIONAL ASSESSMENT
PAIN_FUNCTIONAL_ASSESSMENT: 0-10
PAIN_FUNCTIONAL_ASSESSMENT: NONE - DENIES PAIN

## 2025-04-09 NOTE — ANESTHESIA POSTPROCEDURE EVALUATION
Department of Anesthesiology  Postprocedure Note    Patient: Kaitlin Avalos  MRN: [id number]  YOB: 1960  Date of evaluation: 4/9/2025    Procedure Summary       Date: 04/09/25 Room / Location: [facility] 02 / [facility] MAIN OR    Anesthesia Start: 0824 Anesthesia Stop: 0846    Procedure: PHACO IOL OD (Right: Eye) Diagnosis:       Nuclear sclerotic cataract of right eye      (Nuclear sclerotic cataract of right eye [H25.11])    Surgeons: Vanessa Arroyo MD Responsible Provider: Myron Rollins APRN - CRNA    Anesthesia Type: MAC ASA Status: 3            Anesthesia Type: MAC    Chao Phase I:      Chao Phase II:      Anesthesia Post Evaluation    Patient location during evaluation: bedside  Patient participation: complete - patient participated  Level of consciousness: awake and alert  Airway patency: patent  Nausea & Vomiting: no nausea and no vomiting  Cardiovascular status: hemodynamically stable and blood pressure returned to baseline  Respiratory status: acceptable and room air  Hydration status: euvolemic  Pain management: adequate    No notable events documented.

## 2025-04-09 NOTE — OP NOTE
then the capsular bag and the anterior chamber were deepened with viscoelastic material. The bag was inspected and found to be free of any tears or defects. The intraocular lens implant listed below was placed into the capsular bag with good centration. Irrigation and aspiration was used to remove the residual viscoelastic material from the capsular bag and the anterior chamber and the incision sites were hydrated the BSS and cannula.  The incisions were rehydrated and inspected with a weck-ramirez sponge and were found to be water tight. The eye was inspected and the anterior chamber was deep, the pupil round, and the lens was in good position. A 25G syringe was used to place 0.5cc 40mg/cc Triamcinolone subconjunctivally.  The lid speculum was then removed under visualization. Several drops of antibiotic eye drops were instilled into patient's operated eye. The operated eye was then covered with an eyeshield. The patient tolerated the procedure well and was transferred to recovery in good condition.    Implant Name Type Inv. Item Serial No.  Lot No. LRB No. Used Action   LENS IOL BCNVX -18.5 DIOPT 6X13 MM SINGLE PC EYHANCE - [id number]  LENS IOL BCNVX -18.5 DIOPT 6X13 MM SINGLE PC EYHANCE [id number] [facility] [employer]-  Right 1 Implanted       Vanessa Arroyo MD  04/09/25 8:49 AM

## 2025-04-09 NOTE — H&P
Day of Surgery H&P:  Kaitlin Avalos was seen and examined.  There have been no significant clinical changes since the completion of the exam in the office.  Pt continues to complain of persistent poor vision and/or glare in planned operative eye affecting ability to perform basic ADLs (such as reading or driving at night).        Past Medical History:   Diagnosis Date    Angina pectoris     Abnormal stress test 2022 (ischemia), follow up cardiac cath clear, no CAD or ischemia---medically managed    Asthma     seasonal asthma    Castleman's disease (HCC)     involves lymph nodes-hematology    COPD (chronic obstructive pulmonary disease) (HCC)     denies 3/8/24    COVID-19 02/26/2024    home test    Depression     Diabetes (HCC)     type 2    Fibromyalgia     GERD (gastroesophageal reflux disease)     Heart murmur     Hypercholesterolemia     Hypothyroid     Lung nodules     Primary sclerosing cholangitis ([facility])     Pulmonary embolism 07/18/2019    Rectal pain     discomfort.  small amount of blood on tissue off and on    RLS (restless legs syndrome)     Sleep apnea     cpap       Family Medical History: Non-contributory    HOME MED LIST:  Prior to Admission medications    Medication Sig Start Date End Date Taking? Authorizing Provider   insulin glargine (LANTUS) 100 UNIT/ML injection vial Inject 80 Units into the skin nightly   Yes Tyrone Montanez MD   insulin lispro protamine & lispro (HUMALOG MIX) (75-25) 100 UNIT per ML SUSP injection vial Inject 12 Units into the skin 3 times daily (before meals)   Yes Tyrone Montanez MD   linaclotide (LINZESS) 145 MCG capsule Take 1 capsule by mouth nightly   Yes Tyrone Montanez MD   famotidine (PEPCID) 40 MG tablet Take 1 tablet by mouth 2 times daily   Yes Tyrone Montanez MD   pantoprazole (PROTONIX) 40 MG tablet Take 1 tablet by mouth daily   Yes Tyrone Montanez MD   albuterol sulfate HFA (PROVENTIL;VENTOLIN;PROAIR) 108 (90 Base) MCG/ACT

## 2025-04-09 NOTE — ANESTHESIA PRE PROCEDURE
Plan      MAC     ASA 3       Induction: intravenous.  continuous noninvasive hemodynamic monitor    Anesthetic plan and risks discussed with patient.    Use of blood products discussed with patient whom.    Plan discussed with surgical team, CRNA and proceduralist/surgeon.                MARY Handley - CRNA   4/9/2025

## 2025-08-27 RX ORDER — INSULIN GLARGINE 100 [IU]/ML
60 INJECTION, SOLUTION SUBCUTANEOUS NIGHTLY
COMMUNITY

## 2025-08-27 RX ORDER — ASPIRIN 81 MG/1
81 TABLET ORAL NIGHTLY
COMMUNITY

## 2025-08-27 RX ORDER — VALACYCLOVIR HYDROCHLORIDE 500 MG/1
500 TABLET, FILM COATED ORAL NIGHTLY
COMMUNITY

## 2025-08-27 RX ORDER — INSULIN LISPRO 100 [IU]/ML
12 INJECTION, SOLUTION INTRAVENOUS; SUBCUTANEOUS
COMMUNITY

## 2025-09-03 ENCOUNTER — ANESTHESIA EVENT (OUTPATIENT)
Facility: HOSPITAL | Age: 65
End: 2025-09-03
Payer: MEDICARE

## 2025-09-04 ENCOUNTER — ANESTHESIA (OUTPATIENT)
Facility: HOSPITAL | Age: 65
End: 2025-09-04
Payer: MEDICARE

## 2025-09-04 ENCOUNTER — HOSPITAL ENCOUNTER (OUTPATIENT)
Facility: HOSPITAL | Age: 65
Setting detail: OUTPATIENT SURGERY
Discharge: HOME | End: 2025-09-04
Attending: INTERNAL MEDICINE | Admitting: INTERNAL MEDICINE
Payer: MEDICARE

## 2025-09-04 VITALS
RESPIRATION RATE: 14 BRPM | WEIGHT: 194.3 LBS | TEMPERATURE: 97.8 F | DIASTOLIC BLOOD PRESSURE: 73 MMHG | OXYGEN SATURATION: 95 % | SYSTOLIC BLOOD PRESSURE: 111 MMHG | HEIGHT: 66 IN | HEART RATE: 69 BPM | BODY MASS INDEX: 31.23 KG/M2

## 2025-09-04 LAB
GLUCOSE BLD STRIP.AUTO-MCNC: 84 MG/DL (ref 70–110)
GLUCOSE BLD STRIP.AUTO-MCNC: 91 MG/DL (ref 70–110)

## 2025-09-04 PROCEDURE — 88305 TISSUE EXAM BY PATHOLOGIST: CPT

## 2025-09-04 PROCEDURE — 7100000010 HC PHASE II RECOVERY - FIRST 15 MIN: Performed by: INTERNAL MEDICINE

## 2025-09-04 PROCEDURE — 3600007502: Performed by: INTERNAL MEDICINE

## 2025-09-04 PROCEDURE — 3700000001 HC ADD 15 MINUTES (ANESTHESIA): Performed by: INTERNAL MEDICINE

## 2025-09-04 PROCEDURE — 2709999900 HC NON-CHARGEABLE SUPPLY: Performed by: INTERNAL MEDICINE

## 2025-09-04 PROCEDURE — 6360000002 HC RX W HCPCS: Performed by: ANESTHESIOLOGY

## 2025-09-04 PROCEDURE — 2580000003 HC RX 258: Performed by: NURSE ANESTHETIST, CERTIFIED REGISTERED

## 2025-09-04 PROCEDURE — 2500000003 HC RX 250 WO HCPCS: Performed by: ANESTHESIOLOGY

## 2025-09-04 PROCEDURE — 3700000000 HC ANESTHESIA ATTENDED CARE: Performed by: INTERNAL MEDICINE

## 2025-09-04 PROCEDURE — 3600007512: Performed by: INTERNAL MEDICINE

## 2025-09-04 PROCEDURE — 82962 GLUCOSE BLOOD TEST: CPT

## 2025-09-04 PROCEDURE — 7100000000 HC PACU RECOVERY - FIRST 15 MIN: Performed by: INTERNAL MEDICINE

## 2025-09-04 RX ORDER — SODIUM CHLORIDE, SODIUM LACTATE, POTASSIUM CHLORIDE, CALCIUM CHLORIDE 600; 310; 30; 20 MG/100ML; MG/100ML; MG/100ML; MG/100ML
INJECTION, SOLUTION INTRAVENOUS CONTINUOUS
Status: DISCONTINUED | OUTPATIENT
Start: 2025-09-04 | End: 2025-09-04 | Stop reason: HOSPADM

## 2025-09-04 RX ORDER — PROPOFOL 10 MG/ML
INJECTION, EMULSION INTRAVENOUS
Status: DISCONTINUED | OUTPATIENT
Start: 2025-09-04 | End: 2025-09-04 | Stop reason: SDUPTHER

## 2025-09-04 RX ORDER — DEXTROSE MONOHYDRATE 100 MG/ML
INJECTION, SOLUTION INTRAVENOUS CONTINUOUS PRN
Status: DISCONTINUED | OUTPATIENT
Start: 2025-09-04 | End: 2025-09-04 | Stop reason: HOSPADM

## 2025-09-04 RX ORDER — LIDOCAINE HYDROCHLORIDE 10 MG/ML
1 INJECTION, SOLUTION EPIDURAL; INFILTRATION; INTRACAUDAL; PERINEURAL
Status: DISCONTINUED | OUTPATIENT
Start: 2025-09-04 | End: 2025-09-04 | Stop reason: HOSPADM

## 2025-09-04 RX ORDER — EPHEDRINE SULFATE/0.9% NACL/PF 50 MG/5 ML
SYRINGE (ML) INTRAVENOUS
Status: DISCONTINUED | OUTPATIENT
Start: 2025-09-04 | End: 2025-09-04 | Stop reason: SDUPTHER

## 2025-09-04 RX ORDER — INSULIN LISPRO 100 [IU]/ML
0-4 INJECTION, SOLUTION INTRAVENOUS; SUBCUTANEOUS EVERY 6 HOURS SCHEDULED
Status: DISCONTINUED | OUTPATIENT
Start: 2025-09-04 | End: 2025-09-04 | Stop reason: HOSPADM

## 2025-09-04 RX ADMIN — Medication 10 MG: at 12:09

## 2025-09-04 RX ADMIN — Medication 10 MG: at 12:12

## 2025-09-04 RX ADMIN — Medication 10 MG: at 12:15

## 2025-09-04 RX ADMIN — PROPOFOL 50 MG: 10 INJECTION, EMULSION INTRAVENOUS at 12:19

## 2025-09-04 RX ADMIN — PROPOFOL 25 MG: 10 INJECTION, EMULSION INTRAVENOUS at 12:08

## 2025-09-04 RX ADMIN — PROPOFOL 50 MG: 10 INJECTION, EMULSION INTRAVENOUS at 12:01

## 2025-09-04 RX ADMIN — Medication 10 MG: at 12:20

## 2025-09-04 RX ADMIN — SODIUM CHLORIDE, SODIUM LACTATE, POTASSIUM CHLORIDE, AND CALCIUM CHLORIDE: .6; .31; .03; .02 INJECTION, SOLUTION INTRAVENOUS at 11:10

## 2025-09-04 RX ADMIN — PROPOFOL 25 MG: 10 INJECTION, EMULSION INTRAVENOUS at 12:14

## 2025-09-04 ASSESSMENT — PAIN - FUNCTIONAL ASSESSMENT
PAIN_FUNCTIONAL_ASSESSMENT: 0-10
PAIN_FUNCTIONAL_ASSESSMENT: 0-10

## (undated) DEVICE — SET ANGIO L6.5IN L BOR 3 W STPCOCK SPIK TBNG

## (undated) DEVICE — UNDERPAD INCONT W23XL36IN STD BLU POLYPR BK FLUF SFT

## (undated) DEVICE — LINER SUCT CANSTR 3000CC PLAS SFT PRE ASSEMB W/OUT TBNG W/

## (undated) DEVICE — GOWN ISOLATN UNIV BLU POLYETH PREM OVR THE HD W/ THMB HK

## (undated) DEVICE — SOLUTION IRRIG 250ML STRL H2O PLAS POUR BTL USP

## (undated) DEVICE — SYRINGE MED 10ML LUERLOCK TIP W/O SFTY DISP

## (undated) DEVICE — FORCEPS BX L240CM JAW DIA2.8MM L CAP W/ NDL MIC MESH TOOTH

## (undated) DEVICE — GAUZE,SPONGE,4"X4",16PLY,STRL,LF,10/TRAY: Brand: MEDLINE

## (undated) DEVICE — CATHETER SUCT TR FL TIP 14FR W/ O CTRL

## (undated) DEVICE — BAND COMPR L29CM XLN CLR PLAS HEMSTAT EXT HK AND LOOP RETEN

## (undated) DEVICE — NDL CNTR 40CT FM MAG: Brand: MEDLINE INDUSTRIES, INC.

## (undated) DEVICE — TRAP SPEC POLYP REM STRNR CLN DSGN MAGNIFYING WIND DISP

## (undated) DEVICE — CANNULA ORIG TL CLR W FOAM CUSHIONS AND 14FT SUPL TB 3 CHN

## (undated) DEVICE — SNARE POLYP M W27MMXL240CM OVL STIFF DISP CAPTIVATOR

## (undated) DEVICE — SYRINGE MED 25GA 3ML L5/8IN SUBQ PLAS W/ DETACH NDL SFTY

## (undated) DEVICE — FORCEPS BX L240CM JAW DIA2.4MM ORNG L CAP W/ NDL DISP RAD

## (undated) DEVICE — SYRINGE MED 50ML LUERSLIP TIP

## (undated) DEVICE — CANNULA NSL AD TBNG L14FT STD PVC O2 CRV CONN NONFLARED NSL

## (undated) DEVICE — ENDOSCOPY PUMP TUBING/ CAP SET: Brand: ERBE

## (undated) DEVICE — SET TBNG CAP ENDOSCP PMP BK FLO VLV DISP ERBEFLO

## (undated) DEVICE — GOWN,AURORA,FABRIC-REINFORCED,X-LARGE: Brand: MEDLINE

## (undated) DEVICE — SNARE VASC L240CM LOOP W10MM SHTH DIA2.4MM RND STIFF CLD

## (undated) DEVICE — SURGICAL PROCEDURE KIT LT HRT CUST

## (undated) DEVICE — MEDI-VAC NON-CONDUCTIVE SUCTION TUBING: Brand: CARDINAL HEALTH

## (undated) DEVICE — GLOVE,SURG,SENSICARE SLT,LF,PF,6.5: Brand: MEDLINE

## (undated) DEVICE — YANKAUER,SMOOTH HANDLE,HIGH CAPACITY: Brand: MEDLINE INDUSTRIES, INC.

## (undated) DEVICE — GLOVE,SURG,SENSICARE SLT,LF,PF,7: Brand: MEDLINE

## (undated) DEVICE — SYRINGE 20ML LL S/C 50

## (undated) DEVICE — GLIDESHEATH SLENDER STAINLESS STEEL KIT: Brand: GLIDESHEATH SLENDER

## (undated) DEVICE — SOLUTION IRRIG 1000ML H2O STRL BLT

## (undated) DEVICE — BASIN EMSIS 16OZ GRAPHITE PLAS KID SHP MOLD GRAD FOR ORAL

## (undated) DEVICE — SPONGE GZ 16 PLY WVN COT 4INX4IN STERIL

## (undated) DEVICE — TUBING SUCT L12FT DIA0.187IN CLR W/ MAXI-GRIP AND M/M CONN RSFH ONLY

## (undated) DEVICE — RADIFOCUS OPTITORQUE ANGIOGRAPHIC CATHETER: Brand: OPTITORQUE

## (undated) DEVICE — SKIN MARKER,REGULAR TIP WITH RULER: Brand: DEVON

## (undated) DEVICE — BITE BLOCK ENDOSCP UNIV AD 6 TO 9.4 MM

## (undated) DEVICE — HANDLE YANK HI CAP SMOOTH

## (undated) DEVICE — PRESSURE MONITORING SET: Brand: TRUWAVE

## (undated) DEVICE — STERILE POLYISOPRENE POWDER-FREE SURGICAL GLOVES: Brand: PROTEXIS